# Patient Record
Sex: MALE | Race: WHITE | NOT HISPANIC OR LATINO | Employment: OTHER | URBAN - METROPOLITAN AREA
[De-identification: names, ages, dates, MRNs, and addresses within clinical notes are randomized per-mention and may not be internally consistent; named-entity substitution may affect disease eponyms.]

---

## 2021-04-06 ENCOUNTER — OFFICE VISIT (OUTPATIENT)
Dept: UROLOGY | Facility: CLINIC | Age: 81
End: 2021-04-06
Payer: MEDICARE

## 2021-04-06 ENCOUNTER — LAB VISIT (OUTPATIENT)
Dept: LAB | Facility: HOSPITAL | Age: 81
End: 2021-04-06
Attending: UROLOGY
Payer: MEDICARE

## 2021-04-06 VITALS — WEIGHT: 179.88 LBS

## 2021-04-06 DIAGNOSIS — C61 PROSTATE CANCER: Primary | ICD-10-CM

## 2021-04-06 DIAGNOSIS — K63.5 POLYP OF COLON, UNSPECIFIED PART OF COLON, UNSPECIFIED TYPE: ICD-10-CM

## 2021-04-06 DIAGNOSIS — C61 PROSTATE CANCER: ICD-10-CM

## 2021-04-06 DIAGNOSIS — R15.2 FECAL URGENCY: ICD-10-CM

## 2021-04-06 LAB
BILIRUB SERPL-MCNC: NORMAL MG/DL
BLOOD URINE, POC: NORMAL
CLARITY, POC UA: CLEAR
COLOR, POC UA: NORMAL
GLUCOSE UR QL STRIP: 250
KETONES UR QL STRIP: NORMAL
LEUKOCYTE ESTERASE URINE, POC: NORMAL
NITRITE, POC UA: NORMAL
PH, POC UA: 5
PROTEIN, POC: NORMAL
SPECIFIC GRAVITY, POC UA: 1.01
UROBILINOGEN, POC UA: NORMAL

## 2021-04-06 PROCEDURE — 81002 URINALYSIS NONAUTO W/O SCOPE: CPT | Mod: S$GLB,,, | Performed by: UROLOGY

## 2021-04-06 PROCEDURE — 1101F PR PT FALLS ASSESS DOC 0-1 FALLS W/OUT INJ PAST YR: ICD-10-PCS | Mod: CPTII,S$GLB,, | Performed by: UROLOGY

## 2021-04-06 PROCEDURE — 99999 PR PBB SHADOW E&M-NEW PATIENT-LVL III: ICD-10-PCS | Mod: PBBFAC,,, | Performed by: UROLOGY

## 2021-04-06 PROCEDURE — 84153 ASSAY OF PSA TOTAL: CPT | Performed by: UROLOGY

## 2021-04-06 PROCEDURE — 3288F FALL RISK ASSESSMENT DOCD: CPT | Mod: CPTII,S$GLB,, | Performed by: UROLOGY

## 2021-04-06 PROCEDURE — 1101F PT FALLS ASSESS-DOCD LE1/YR: CPT | Mod: CPTII,S$GLB,, | Performed by: UROLOGY

## 2021-04-06 PROCEDURE — 81002 POCT URINE DIPSTICK WITHOUT MICROSCOPE: ICD-10-PCS | Mod: S$GLB,,, | Performed by: UROLOGY

## 2021-04-06 PROCEDURE — 99213 OFFICE O/P EST LOW 20 MIN: CPT | Mod: 25,S$GLB,, | Performed by: UROLOGY

## 2021-04-06 PROCEDURE — 99213 PR OFFICE/OUTPT VISIT, EST, LEVL III, 20-29 MIN: ICD-10-PCS | Mod: 25,S$GLB,, | Performed by: UROLOGY

## 2021-04-06 PROCEDURE — 99999 PR PBB SHADOW E&M-NEW PATIENT-LVL III: CPT | Mod: PBBFAC,,, | Performed by: UROLOGY

## 2021-04-06 PROCEDURE — 1159F PR MEDICATION LIST DOCUMENTED IN MEDICAL RECORD: ICD-10-PCS | Mod: S$GLB,,, | Performed by: UROLOGY

## 2021-04-06 PROCEDURE — 3288F PR FALLS RISK ASSESSMENT DOCUMENTED: ICD-10-PCS | Mod: CPTII,S$GLB,, | Performed by: UROLOGY

## 2021-04-06 PROCEDURE — 36415 COLL VENOUS BLD VENIPUNCTURE: CPT | Mod: PO | Performed by: UROLOGY

## 2021-04-06 PROCEDURE — 1159F MED LIST DOCD IN RCRD: CPT | Mod: S$GLB,,, | Performed by: UROLOGY

## 2021-04-06 RX ORDER — SIMVASTATIN 40 MG/1
40 TABLET, FILM COATED ORAL DAILY
COMMUNITY
Start: 2021-03-15

## 2021-04-06 RX ORDER — METFORMIN HYDROCHLORIDE 1000 MG/1
1000 TABLET ORAL 2 TIMES DAILY
COMMUNITY

## 2021-04-06 RX ORDER — PANTOPRAZOLE SODIUM 40 MG/1
40 TABLET, DELAYED RELEASE ORAL DAILY
COMMUNITY
Start: 2021-03-15 | End: 2023-04-25

## 2021-04-06 RX ORDER — CETIRIZINE HYDROCHLORIDE 10 MG/1
10 TABLET ORAL DAILY
COMMUNITY
Start: 2021-02-13 | End: 2022-04-05

## 2021-04-06 RX ORDER — EMPAGLIFLOZIN 25 MG/1
25 TABLET, FILM COATED ORAL DAILY
COMMUNITY
Start: 2021-01-14

## 2021-04-06 RX ORDER — LOSARTAN POTASSIUM 100 MG/1
100 TABLET ORAL DAILY
COMMUNITY
Start: 2021-03-15

## 2021-04-06 RX ORDER — GLIMEPIRIDE 2 MG/1
2 TABLET ORAL DAILY
COMMUNITY
End: 2022-10-19

## 2021-04-06 RX ORDER — ERGOCALCIFEROL 1.25 MG/1
50000 CAPSULE ORAL
COMMUNITY
End: 2022-04-05

## 2021-04-07 LAB — COMPLEXED PSA SERPL-MCNC: 0.82 NG/ML (ref 0–4)

## 2021-07-01 ENCOUNTER — PATIENT MESSAGE (OUTPATIENT)
Dept: ADMINISTRATIVE | Facility: OTHER | Age: 81
End: 2021-07-01

## 2021-08-06 ENCOUNTER — OFFICE VISIT (OUTPATIENT)
Dept: UROLOGY | Facility: CLINIC | Age: 81
End: 2021-08-06
Payer: MEDICARE

## 2021-08-06 VITALS
SYSTOLIC BLOOD PRESSURE: 110 MMHG | DIASTOLIC BLOOD PRESSURE: 60 MMHG | WEIGHT: 172.81 LBS | HEIGHT: 69 IN | BODY MASS INDEX: 25.6 KG/M2

## 2021-08-06 DIAGNOSIS — N52.9 ERECTILE DYSFUNCTION, UNSPECIFIED ERECTILE DYSFUNCTION TYPE: ICD-10-CM

## 2021-08-06 DIAGNOSIS — N50.9 SCROTAL SKIN LESION: ICD-10-CM

## 2021-08-06 DIAGNOSIS — C61 PROSTATE CANCER: Primary | ICD-10-CM

## 2021-08-06 LAB
BILIRUB SERPL-MCNC: NORMAL MG/DL
BLOOD URINE, POC: NORMAL
CLARITY, POC UA: CLEAR
COLOR, POC UA: YELLOW
GLUCOSE UR QL STRIP: 1000
KETONES UR QL STRIP: NORMAL
LEUKOCYTE ESTERASE URINE, POC: NORMAL
NITRITE, POC UA: NORMAL
PH, POC UA: 5
PROTEIN, POC: NORMAL
SPECIFIC GRAVITY, POC UA: 1.01
UROBILINOGEN, POC UA: NORMAL

## 2021-08-06 PROCEDURE — 3078F DIAST BP <80 MM HG: CPT | Mod: CPTII,S$GLB,, | Performed by: UROLOGY

## 2021-08-06 PROCEDURE — 1160F RVW MEDS BY RX/DR IN RCRD: CPT | Mod: CPTII,S$GLB,, | Performed by: UROLOGY

## 2021-08-06 PROCEDURE — 3074F PR MOST RECENT SYSTOLIC BLOOD PRESSURE < 130 MM HG: ICD-10-PCS | Mod: CPTII,S$GLB,, | Performed by: UROLOGY

## 2021-08-06 PROCEDURE — 3288F FALL RISK ASSESSMENT DOCD: CPT | Mod: CPTII,S$GLB,, | Performed by: UROLOGY

## 2021-08-06 PROCEDURE — 99999 PR PBB SHADOW E&M-EST. PATIENT-LVL III: CPT | Mod: PBBFAC,,, | Performed by: UROLOGY

## 2021-08-06 PROCEDURE — 99999 PR PBB SHADOW E&M-EST. PATIENT-LVL III: ICD-10-PCS | Mod: PBBFAC,,, | Performed by: UROLOGY

## 2021-08-06 PROCEDURE — 1126F PR PAIN SEVERITY QUANTIFIED, NO PAIN PRESENT: ICD-10-PCS | Mod: CPTII,S$GLB,, | Performed by: UROLOGY

## 2021-08-06 PROCEDURE — 3074F SYST BP LT 130 MM HG: CPT | Mod: CPTII,S$GLB,, | Performed by: UROLOGY

## 2021-08-06 PROCEDURE — 1159F MED LIST DOCD IN RCRD: CPT | Mod: CPTII,S$GLB,, | Performed by: UROLOGY

## 2021-08-06 PROCEDURE — 1101F PT FALLS ASSESS-DOCD LE1/YR: CPT | Mod: CPTII,S$GLB,, | Performed by: UROLOGY

## 2021-08-06 PROCEDURE — 1101F PR PT FALLS ASSESS DOC 0-1 FALLS W/OUT INJ PAST YR: ICD-10-PCS | Mod: CPTII,S$GLB,, | Performed by: UROLOGY

## 2021-08-06 PROCEDURE — 99214 OFFICE O/P EST MOD 30 MIN: CPT | Mod: S$GLB,,, | Performed by: UROLOGY

## 2021-08-06 PROCEDURE — 1160F PR REVIEW ALL MEDS BY PRESCRIBER/CLIN PHARMACIST DOCUMENTED: ICD-10-PCS | Mod: CPTII,S$GLB,, | Performed by: UROLOGY

## 2021-08-06 PROCEDURE — 99214 PR OFFICE/OUTPT VISIT, EST, LEVL IV, 30-39 MIN: ICD-10-PCS | Mod: S$GLB,,, | Performed by: UROLOGY

## 2021-08-06 PROCEDURE — 1159F PR MEDICATION LIST DOCUMENTED IN MEDICAL RECORD: ICD-10-PCS | Mod: CPTII,S$GLB,, | Performed by: UROLOGY

## 2021-08-06 PROCEDURE — 1126F AMNT PAIN NOTED NONE PRSNT: CPT | Mod: CPTII,S$GLB,, | Performed by: UROLOGY

## 2021-08-06 PROCEDURE — 81002 URINALYSIS NONAUTO W/O SCOPE: CPT | Mod: S$GLB,,, | Performed by: UROLOGY

## 2021-08-06 PROCEDURE — 3078F PR MOST RECENT DIASTOLIC BLOOD PRESSURE < 80 MM HG: ICD-10-PCS | Mod: CPTII,S$GLB,, | Performed by: UROLOGY

## 2021-08-06 PROCEDURE — 3288F PR FALLS RISK ASSESSMENT DOCUMENTED: ICD-10-PCS | Mod: CPTII,S$GLB,, | Performed by: UROLOGY

## 2021-08-06 PROCEDURE — 81002 POCT URINE DIPSTICK WITHOUT MICROSCOPE: ICD-10-PCS | Mod: S$GLB,,, | Performed by: UROLOGY

## 2021-08-06 RX ORDER — TAMSULOSIN HYDROCHLORIDE 0.4 MG/1
CAPSULE ORAL
COMMUNITY
End: 2021-08-06 | Stop reason: SDUPTHER

## 2021-08-06 RX ORDER — TAMSULOSIN HYDROCHLORIDE 0.4 MG/1
CAPSULE ORAL
Qty: 30 CAPSULE | Refills: 11 | Status: SHIPPED | OUTPATIENT
Start: 2021-08-06 | End: 2022-04-05

## 2021-08-09 ENCOUNTER — PATIENT MESSAGE (OUTPATIENT)
Dept: UROLOGY | Facility: CLINIC | Age: 81
End: 2021-08-09

## 2021-08-09 RX ORDER — SILDENAFIL 100 MG/1
100 TABLET, FILM COATED ORAL DAILY PRN
Qty: 10 TABLET | Refills: 11 | Status: SHIPPED | OUTPATIENT
Start: 2021-08-09 | End: 2022-04-05

## 2021-09-27 ENCOUNTER — LAB VISIT (OUTPATIENT)
Dept: LAB | Facility: HOSPITAL | Age: 81
End: 2021-09-27
Attending: UROLOGY
Payer: MEDICARE

## 2021-09-27 ENCOUNTER — TELEPHONE (OUTPATIENT)
Dept: UROLOGY | Facility: CLINIC | Age: 81
End: 2021-09-27

## 2021-09-27 DIAGNOSIS — C61 PROSTATE CANCER: ICD-10-CM

## 2021-09-27 LAB — COMPLEXED PSA SERPL-MCNC: 0.29 NG/ML (ref 0–4)

## 2021-09-27 PROCEDURE — 36415 COLL VENOUS BLD VENIPUNCTURE: CPT | Mod: PO | Performed by: UROLOGY

## 2021-09-27 PROCEDURE — 84153 ASSAY OF PSA TOTAL: CPT | Performed by: UROLOGY

## 2021-10-05 ENCOUNTER — OFFICE VISIT (OUTPATIENT)
Dept: UROLOGY | Facility: CLINIC | Age: 81
End: 2021-10-05
Payer: MEDICARE

## 2021-10-05 VITALS
WEIGHT: 175.25 LBS | DIASTOLIC BLOOD PRESSURE: 70 MMHG | BODY MASS INDEX: 26.26 KG/M2 | SYSTOLIC BLOOD PRESSURE: 110 MMHG

## 2021-10-05 DIAGNOSIS — C61 PROSTATE CANCER: ICD-10-CM

## 2021-10-05 DIAGNOSIS — R30.0 DYSURIA: Primary | ICD-10-CM

## 2021-10-05 LAB
BILIRUB SERPL-MCNC: NORMAL MG/DL
BLOOD URINE, POC: NORMAL
CLARITY, POC UA: CLEAR
COLOR, POC UA: YELLOW
GLUCOSE UR QL STRIP: 250
KETONES UR QL STRIP: NORMAL
LEUKOCYTE ESTERASE URINE, POC: NORMAL
NITRITE, POC UA: NORMAL
PH, POC UA: 5
PROTEIN, POC: NORMAL
SPECIFIC GRAVITY, POC UA: 1.01
UROBILINOGEN, POC UA: NORMAL

## 2021-10-05 PROCEDURE — 1126F PR PAIN SEVERITY QUANTIFIED, NO PAIN PRESENT: ICD-10-PCS | Mod: CPTII,S$GLB,, | Performed by: UROLOGY

## 2021-10-05 PROCEDURE — 87086 URINE CULTURE/COLONY COUNT: CPT | Performed by: UROLOGY

## 2021-10-05 PROCEDURE — 3288F FALL RISK ASSESSMENT DOCD: CPT | Mod: CPTII,S$GLB,, | Performed by: UROLOGY

## 2021-10-05 PROCEDURE — 81002 URINALYSIS NONAUTO W/O SCOPE: CPT | Mod: S$GLB,,, | Performed by: UROLOGY

## 2021-10-05 PROCEDURE — 1159F MED LIST DOCD IN RCRD: CPT | Mod: CPTII,S$GLB,, | Performed by: UROLOGY

## 2021-10-05 PROCEDURE — 99213 PR OFFICE/OUTPT VISIT, EST, LEVL III, 20-29 MIN: ICD-10-PCS | Mod: S$GLB,,, | Performed by: UROLOGY

## 2021-10-05 PROCEDURE — 81002 POCT URINE DIPSTICK WITHOUT MICROSCOPE: ICD-10-PCS | Mod: S$GLB,,, | Performed by: UROLOGY

## 2021-10-05 PROCEDURE — 1159F PR MEDICATION LIST DOCUMENTED IN MEDICAL RECORD: ICD-10-PCS | Mod: CPTII,S$GLB,, | Performed by: UROLOGY

## 2021-10-05 PROCEDURE — 99999 PR PBB SHADOW E&M-EST. PATIENT-LVL III: ICD-10-PCS | Mod: PBBFAC,,, | Performed by: UROLOGY

## 2021-10-05 PROCEDURE — 3078F DIAST BP <80 MM HG: CPT | Mod: CPTII,S$GLB,, | Performed by: UROLOGY

## 2021-10-05 PROCEDURE — 1126F AMNT PAIN NOTED NONE PRSNT: CPT | Mod: CPTII,S$GLB,, | Performed by: UROLOGY

## 2021-10-05 PROCEDURE — 99999 PR PBB SHADOW E&M-EST. PATIENT-LVL III: CPT | Mod: PBBFAC,,, | Performed by: UROLOGY

## 2021-10-05 PROCEDURE — 3074F SYST BP LT 130 MM HG: CPT | Mod: CPTII,S$GLB,, | Performed by: UROLOGY

## 2021-10-05 PROCEDURE — 3078F PR MOST RECENT DIASTOLIC BLOOD PRESSURE < 80 MM HG: ICD-10-PCS | Mod: CPTII,S$GLB,, | Performed by: UROLOGY

## 2021-10-05 PROCEDURE — 3288F PR FALLS RISK ASSESSMENT DOCUMENTED: ICD-10-PCS | Mod: CPTII,S$GLB,, | Performed by: UROLOGY

## 2021-10-05 PROCEDURE — 99213 OFFICE O/P EST LOW 20 MIN: CPT | Mod: S$GLB,,, | Performed by: UROLOGY

## 2021-10-05 PROCEDURE — 1101F PR PT FALLS ASSESS DOC 0-1 FALLS W/OUT INJ PAST YR: ICD-10-PCS | Mod: CPTII,S$GLB,, | Performed by: UROLOGY

## 2021-10-05 PROCEDURE — 1101F PT FALLS ASSESS-DOCD LE1/YR: CPT | Mod: CPTII,S$GLB,, | Performed by: UROLOGY

## 2021-10-05 PROCEDURE — 3074F PR MOST RECENT SYSTOLIC BLOOD PRESSURE < 130 MM HG: ICD-10-PCS | Mod: CPTII,S$GLB,, | Performed by: UROLOGY

## 2021-10-06 LAB
BACTERIA UR CULT: NORMAL
BACTERIA UR CULT: NORMAL

## 2022-04-01 ENCOUNTER — LAB VISIT (OUTPATIENT)
Dept: LAB | Facility: HOSPITAL | Age: 82
End: 2022-04-01
Attending: UROLOGY
Payer: MEDICARE

## 2022-04-01 DIAGNOSIS — C61 PROSTATE CANCER: ICD-10-CM

## 2022-04-01 PROCEDURE — 84153 ASSAY OF PSA TOTAL: CPT | Performed by: UROLOGY

## 2022-04-01 PROCEDURE — 36415 COLL VENOUS BLD VENIPUNCTURE: CPT | Mod: PO | Performed by: UROLOGY

## 2022-04-02 LAB — COMPLEXED PSA SERPL-MCNC: 0.17 NG/ML (ref 0–4)

## 2022-04-05 ENCOUNTER — OFFICE VISIT (OUTPATIENT)
Dept: UROLOGY | Facility: CLINIC | Age: 82
End: 2022-04-05
Payer: MEDICARE

## 2022-04-05 VITALS
WEIGHT: 175.69 LBS | HEIGHT: 69 IN | SYSTOLIC BLOOD PRESSURE: 116 MMHG | BODY MASS INDEX: 26.02 KG/M2 | DIASTOLIC BLOOD PRESSURE: 66 MMHG

## 2022-04-05 DIAGNOSIS — E11.9 TYPE 2 DIABETES MELLITUS WITHOUT COMPLICATION, WITHOUT LONG-TERM CURRENT USE OF INSULIN: ICD-10-CM

## 2022-04-05 DIAGNOSIS — C61 PROSTATE CANCER: Primary | ICD-10-CM

## 2022-04-05 DIAGNOSIS — N52.9 ERECTILE DYSFUNCTION, UNSPECIFIED ERECTILE DYSFUNCTION TYPE: ICD-10-CM

## 2022-04-05 PROCEDURE — 99214 PR OFFICE/OUTPT VISIT, EST, LEVL IV, 30-39 MIN: ICD-10-PCS | Mod: S$GLB,,, | Performed by: UROLOGY

## 2022-04-05 PROCEDURE — 3288F FALL RISK ASSESSMENT DOCD: CPT | Mod: CPTII,S$GLB,, | Performed by: UROLOGY

## 2022-04-05 PROCEDURE — 3078F PR MOST RECENT DIASTOLIC BLOOD PRESSURE < 80 MM HG: ICD-10-PCS | Mod: CPTII,S$GLB,, | Performed by: UROLOGY

## 2022-04-05 PROCEDURE — 1101F PR PT FALLS ASSESS DOC 0-1 FALLS W/OUT INJ PAST YR: ICD-10-PCS | Mod: CPTII,S$GLB,, | Performed by: UROLOGY

## 2022-04-05 PROCEDURE — 3074F PR MOST RECENT SYSTOLIC BLOOD PRESSURE < 130 MM HG: ICD-10-PCS | Mod: CPTII,S$GLB,, | Performed by: UROLOGY

## 2022-04-05 PROCEDURE — 1101F PT FALLS ASSESS-DOCD LE1/YR: CPT | Mod: CPTII,S$GLB,, | Performed by: UROLOGY

## 2022-04-05 PROCEDURE — 99999 PR PBB SHADOW E&M-EST. PATIENT-LVL III: ICD-10-PCS | Mod: PBBFAC,,, | Performed by: UROLOGY

## 2022-04-05 PROCEDURE — 1126F AMNT PAIN NOTED NONE PRSNT: CPT | Mod: CPTII,S$GLB,, | Performed by: UROLOGY

## 2022-04-05 PROCEDURE — 1159F MED LIST DOCD IN RCRD: CPT | Mod: CPTII,S$GLB,, | Performed by: UROLOGY

## 2022-04-05 PROCEDURE — 3074F SYST BP LT 130 MM HG: CPT | Mod: CPTII,S$GLB,, | Performed by: UROLOGY

## 2022-04-05 PROCEDURE — 99214 OFFICE O/P EST MOD 30 MIN: CPT | Mod: S$GLB,,, | Performed by: UROLOGY

## 2022-04-05 PROCEDURE — 3288F PR FALLS RISK ASSESSMENT DOCUMENTED: ICD-10-PCS | Mod: CPTII,S$GLB,, | Performed by: UROLOGY

## 2022-04-05 PROCEDURE — 3078F DIAST BP <80 MM HG: CPT | Mod: CPTII,S$GLB,, | Performed by: UROLOGY

## 2022-04-05 PROCEDURE — 1159F PR MEDICATION LIST DOCUMENTED IN MEDICAL RECORD: ICD-10-PCS | Mod: CPTII,S$GLB,, | Performed by: UROLOGY

## 2022-04-05 PROCEDURE — 1126F PR PAIN SEVERITY QUANTIFIED, NO PAIN PRESENT: ICD-10-PCS | Mod: CPTII,S$GLB,, | Performed by: UROLOGY

## 2022-04-05 PROCEDURE — 99999 PR PBB SHADOW E&M-EST. PATIENT-LVL III: CPT | Mod: PBBFAC,,, | Performed by: UROLOGY

## 2022-04-05 RX ORDER — TADALAFIL 20 MG/1
20 TABLET ORAL DAILY PRN
Qty: 30 TABLET | Refills: 11 | Status: SHIPPED | OUTPATIENT
Start: 2022-04-05 | End: 2023-04-25 | Stop reason: SDUPTHER

## 2022-04-05 NOTE — PROGRESS NOTES
Chief Complaint   Patient presents with    Other     6 month w psa         History of Present Illness:   Brock Camara is a 82 y.o. male here for follow up.     4/5/22-Good stream. No gross hematuria. Still has diarrhea. No urinary urgency. No longer on flomax. Still with ED. viagra didn't work well. Reports diabetic control is fair.   10/5/21-On flomax. Reports dysuria every time he urinates. No gross hematuria. Drinking cranberry juice, which helps. Stream is weak.   8/6/21: Pt reports retrograde ejaculation, ED and a growth on the side of his scrotum. He states that he ejaculated in June and had a severe pain in his Left groin. Restarted flomax, but wasn't able to ejaculate even before the flomax. After restarting the flomax, ejaculated, but no pain, not much semen comes out. Hasn't really ejaculated much since radiation. Denies stranguria. Slight dysuria, but not much. Has some initial dribbling of his stream, but then it strengthens. Had a colonoscopy and EUS. Had 5 polyps. Still has Fecal Urgency.   4/6/21: Brock Camara is a 81 y.o. male here for follow up. He has a h/o [place] 4+3 prostate adenocarcinoma, diagnosed 5/2020 with a PSA of 7.6. Completed XRT 8/2020 with Dr. Carney. No ADT. Urinating okay. Slight dysuria. No gross hematuria. Stopped taking flomax around September because he wasn't having any pain. Also has fecal urgency and FI. States that he hasn't been back to see his Gastroenterologist, but needs to, due to h/o 16 polyps on last colonoscopy.     Review of Systems   Constitutional: Negative for chills and fever.   Respiratory: Negative for shortness of breath.    Cardiovascular: Negative for chest pain.   Gastrointestinal: Positive for diarrhea. Negative for abdominal pain.   Genitourinary: Negative for difficulty urinating.   All other systems reviewed and are negative.      Current Outpatient Medications   Medication Sig Dispense Refill    glimepiride (AMARYL) 2 MG tablet 2 mg once  "daily.      JARDIANCE 25 mg tablet Take 25 mg by mouth once daily.      losartan (COZAAR) 100 MG tablet Take 100 mg by mouth once daily.      metFORMIN (GLUCOPHAGE) 1000 MG tablet 1,000 mg 2 (two) times a day.      pantoprazole (PROTONIX) 40 MG tablet Take 40 mg by mouth once daily.      simvastatin (ZOCOR) 40 MG tablet Take 40 mg by mouth once daily.      cetirizine (ZYRTEC) 10 MG tablet Take 10 mg by mouth once daily.      ergocalciferol (VITAMIN D2) 50,000 unit Cap Take 50,000 Units by mouth every 7 days.      tadalafiL (CIALIS) 20 MG Tab Take 1 tablet (20 mg total) by mouth daily as needed (intercourse). 30 tablet 11     No current facility-administered medications for this visit.       Review of patient's allergies indicates:   Allergen Reactions    Adhesive      Pt states allergic to "Paper Tape" = rash       Past medical, family, and social history reviewed as documented in chart with pertinent positive medical, family, and social history detailed in HPI.    Physical Exam  Vitals:    04/05/22 1416   BP: 116/66       General: Well-developed, well-nourished, in no acute distress  HEENT: Normocephalic, atraumatic, extraocular eye movements in tact  Neck: supple, trachea midline, no cervical or supraclavicular adenopathy  Respirations: Even and unlabored  Abdomen: soft, Non-tender, Non-distended, no palpable masses, no rebound or guarding  Extremities: Moves all extremities equally, atraumatic, no clubbing, cyanosis, trace edema  Skin: warm and dry, no lesions  Psych: normal affect  Neuro: Alert and oriented x 3. Cranial nerves II-XII grossly intact    Urinalysis  Negative for blood, LE, nitrite    PSA  4/6/21-0.82  9/27/21-0.29  4/1/22: 0.17    Assessment:   1. Prostate cancer  Prostate Specific Antigen, Diagnostic   2. Erectile dysfunction, unspecified erectile dysfunction type  POCT URINE DIPSTICK WITHOUT MICROSCOPE   3. Type 2 diabetes mellitus without complication, without long-term current use of " insulin           Plan:  Prostate cancer  -     Prostate Specific Antigen, Diagnostic; Future; Expected date: 10/05/2022    Erectile dysfunction, unspecified erectile dysfunction type  -     POCT URINE DIPSTICK WITHOUT MICROSCOPE    Type 2 diabetes mellitus without complication, without long-term current use of insulin    Other orders  -     tadalafiL (CIALIS) 20 MG Tab; Take 1 tablet (20 mg total) by mouth daily as needed (intercourse).  Dispense: 30 tablet; Refill: 11    continue current diabetic management  Discussed [facility]. Pt not interested, but would like to try cialis.   Follow up in about 6 months (around 10/5/2022) for labs before visit.

## 2022-10-05 ENCOUNTER — PATIENT MESSAGE (OUTPATIENT)
Dept: UROLOGY | Facility: CLINIC | Age: 82
End: 2022-10-05
Payer: MEDICARE

## 2022-10-05 ENCOUNTER — TELEPHONE (OUTPATIENT)
Dept: UROLOGY | Facility: CLINIC | Age: 82
End: 2022-10-05
Payer: MEDICARE

## 2022-10-05 ENCOUNTER — LAB VISIT (OUTPATIENT)
Dept: LAB | Facility: HOSPITAL | Age: 82
End: 2022-10-05
Attending: UROLOGY
Payer: MEDICARE

## 2022-10-05 DIAGNOSIS — C61 PROSTATE CANCER: ICD-10-CM

## 2022-10-05 LAB — COMPLEXED PSA SERPL-MCNC: 0.1 NG/ML (ref 0–4)

## 2022-10-05 PROCEDURE — 84153 ASSAY OF PSA TOTAL: CPT | Performed by: UROLOGY

## 2022-10-05 PROCEDURE — 36415 COLL VENOUS BLD VENIPUNCTURE: CPT | Mod: PO | Performed by: UROLOGY

## 2022-10-05 NOTE — TELEPHONE ENCOUNTER
Called pt, no answer, left message on voicemail informing pt that Dr Masters had to leave early due to being exposed to covid and we need to get him rescheduled.

## 2022-10-18 ENCOUNTER — OFFICE VISIT (OUTPATIENT)
Dept: UROLOGY | Facility: CLINIC | Age: 82
End: 2022-10-18
Payer: MEDICARE

## 2022-10-18 VITALS
DIASTOLIC BLOOD PRESSURE: 70 MMHG | WEIGHT: 165.38 LBS | BODY MASS INDEX: 24.77 KG/M2 | SYSTOLIC BLOOD PRESSURE: 110 MMHG

## 2022-10-18 DIAGNOSIS — C61 PROSTATE CANCER: Primary | ICD-10-CM

## 2022-10-18 DIAGNOSIS — R19.7 DIARRHEA, UNSPECIFIED TYPE: ICD-10-CM

## 2022-10-18 DIAGNOSIS — N52.35 ERECTILE DYSFUNCTION FOLLOWING RADIATION THERAPY: ICD-10-CM

## 2022-10-18 DIAGNOSIS — E11.9 TYPE 2 DIABETES MELLITUS WITHOUT COMPLICATION, WITHOUT LONG-TERM CURRENT USE OF INSULIN: ICD-10-CM

## 2022-10-18 PROCEDURE — 1160F RVW MEDS BY RX/DR IN RCRD: CPT | Mod: CPTII,S$GLB,, | Performed by: UROLOGY

## 2022-10-18 PROCEDURE — 1126F AMNT PAIN NOTED NONE PRSNT: CPT | Mod: CPTII,S$GLB,, | Performed by: UROLOGY

## 2022-10-18 PROCEDURE — 1159F MED LIST DOCD IN RCRD: CPT | Mod: CPTII,S$GLB,, | Performed by: UROLOGY

## 2022-10-18 PROCEDURE — 1159F PR MEDICATION LIST DOCUMENTED IN MEDICAL RECORD: ICD-10-PCS | Mod: CPTII,S$GLB,, | Performed by: UROLOGY

## 2022-10-18 PROCEDURE — 1126F PR PAIN SEVERITY QUANTIFIED, NO PAIN PRESENT: ICD-10-PCS | Mod: CPTII,S$GLB,, | Performed by: UROLOGY

## 2022-10-18 PROCEDURE — 99214 OFFICE O/P EST MOD 30 MIN: CPT | Mod: S$GLB,,, | Performed by: UROLOGY

## 2022-10-18 PROCEDURE — 3074F SYST BP LT 130 MM HG: CPT | Mod: CPTII,S$GLB,, | Performed by: UROLOGY

## 2022-10-18 PROCEDURE — 3074F PR MOST RECENT SYSTOLIC BLOOD PRESSURE < 130 MM HG: ICD-10-PCS | Mod: CPTII,S$GLB,, | Performed by: UROLOGY

## 2022-10-18 PROCEDURE — 3078F PR MOST RECENT DIASTOLIC BLOOD PRESSURE < 80 MM HG: ICD-10-PCS | Mod: CPTII,S$GLB,, | Performed by: UROLOGY

## 2022-10-18 PROCEDURE — 99999 PR PBB SHADOW E&M-EST. PATIENT-LVL II: ICD-10-PCS | Mod: PBBFAC,,, | Performed by: UROLOGY

## 2022-10-18 PROCEDURE — 99999 PR PBB SHADOW E&M-EST. PATIENT-LVL II: CPT | Mod: PBBFAC,,, | Performed by: UROLOGY

## 2022-10-18 PROCEDURE — 1160F PR REVIEW ALL MEDS BY PRESCRIBER/CLIN PHARMACIST DOCUMENTED: ICD-10-PCS | Mod: CPTII,S$GLB,, | Performed by: UROLOGY

## 2022-10-18 PROCEDURE — 3078F DIAST BP <80 MM HG: CPT | Mod: CPTII,S$GLB,, | Performed by: UROLOGY

## 2022-10-18 PROCEDURE — 99214 PR OFFICE/OUTPT VISIT, EST, LEVL IV, 30-39 MIN: ICD-10-PCS | Mod: S$GLB,,, | Performed by: UROLOGY

## 2022-10-18 NOTE — PROGRESS NOTES
CC: follow up prostate cancer    History of Present Illness:   Brock Camara is a 82 y.o. male here for follow up.   10/18/22- No urgency or gross hematuria. Occasional dysuria, but not bad. Cialis working well. Energy varies. No bone pain. Still with some diarrhea post-XRT.   4/5/22-Good stream. No gross hematuria. Still has diarrhea. No urinary urgency. No longer on flomax. Still with ED. viagra didn't work well. Reports diabetic control is fair.   10/5/21-On flomax. Reports dysuria every time he urinates. No gross hematuria. Drinking cranberry juice, which helps. Stream is weak.   8/6/21: Pt reports retrograde ejaculation, ED and a growth on the side of his scrotum. He states that he ejaculated in June and had a severe pain in his Left groin. Restarted flomax, but wasn't able to ejaculate even before the flomax. After restarting the flomax, ejaculated, but no pain, not much semen comes out. Hasn't really ejaculated much since radiation. Denies stranguria. Slight dysuria, but not much. Has some initial dribbling of his stream, but then it strengthens. Had a colonoscopy and EUS. Had 5 polyps. Still has Fecal Urgency.   4/6/21: Brock Camara is a 81 y.o. male here for follow up. He has a h/o [place] 4+3 prostate adenocarcinoma, diagnosed 5/2020 with a PSA of 7.6. Completed XRT 8/2020 with Dr. Carney. No ADT. Urinating okay. Slight dysuria. No gross hematuria. Stopped taking flomax around September because he wasn't having any pain. Also has fecal urgency and FI. States that he hasn't been back to see his Gastroenterologist, but needs to, due to h/o 16 polyps on last colonoscopy.     Review of Systems   Constitutional:  Negative for chills and fever.   Respiratory:  Negative for shortness of breath.    Cardiovascular:  Negative for chest pain.   Gastrointestinal:  Positive for diarrhea. Negative for abdominal pain.   Genitourinary:  Negative for difficulty urinating and hematuria.   All other systems reviewed  "and are negative.    Current Outpatient Medications   Medication Sig Dispense Refill    glimepiride (AMARYL) 2 MG tablet 2 mg once daily.      JARDIANCE 25 mg tablet Take 25 mg by mouth once daily.      losartan (COZAAR) 100 MG tablet Take 100 mg by mouth once daily.      metFORMIN (GLUCOPHAGE) 1000 MG tablet 1,000 mg 2 (two) times a day.      pantoprazole (PROTONIX) 40 MG tablet Take 40 mg by mouth once daily.      simvastatin (ZOCOR) 40 MG tablet Take 40 mg by mouth once daily.      tadalafiL (CIALIS) 20 MG Tab Take 1 tablet (20 mg total) by mouth daily as needed (intercourse). 30 tablet 11     No current facility-administered medications for this visit.       Review of patient's allergies indicates:   Allergen Reactions    Adhesive      Pt states allergic to "Paper Tape" = rash       Past medical, family, and social history reviewed as documented in chart with pertinent positive medical, family, and social history detailed in HPI.    Physical Exam  Vitals:    10/18/22 1606   BP: 110/70       General: Well-developed, well-nourished, in no acute distress  HEENT: Normocephalic, atraumatic, extraocular eye movements in tact  Neck: supple, trachea midline, no cervical or supraclavicular adenopathy  Respirations: Even and unlabored  Abdomen: soft, Non-tender, Non-distended, no palpable masses, no rebound or guarding  Extremities: Moves all extremities equally, atraumatic, no clubbing, cyanosis, trace edema  Skin: warm and dry, no lesions  Psych: normal affect  Neuro: Alert and oriented x 3. Cranial nerves II-XII grossly intact    Urinalysis  Negative for blood, LE, nitrite, 250 glucose    PSA  4/6/21-0.82  9/27/21-0.29  4/1/22: 0.17  10/5/22: 0.10    Assessment:   1. Prostate cancer  Prostate Specific Antigen, Diagnostic      2. Erectile dysfunction following radiation therapy        3. Diarrhea, unspecified type        4. Type 2 diabetes mellitus without complication, without long-term current use of insulin      "           Plan:  Prostate cancer  -     Prostate Specific Antigen, Diagnostic; Future; Expected date: 04/18/2023    Erectile dysfunction following radiation therapy    Diarrhea, unspecified type    Type 2 diabetes mellitus without complication, without long-term current use of insulin  PSA decreasing appropriately  continue current diabetic management  Continue cialis PRN.   Follow up in about 6 months (around 4/18/2023) for labs before visit.

## 2022-10-19 PROBLEM — E11.9 TYPE 2 DIABETES MELLITUS WITHOUT COMPLICATION, WITHOUT LONG-TERM CURRENT USE OF INSULIN: Status: ACTIVE | Noted: 2022-10-19

## 2023-04-19 ENCOUNTER — LAB VISIT (OUTPATIENT)
Dept: LAB | Facility: HOSPITAL | Age: 83
End: 2023-04-19
Attending: UROLOGY
Payer: MEDICARE

## 2023-04-19 DIAGNOSIS — C61 PROSTATE CANCER: ICD-10-CM

## 2023-04-19 LAB — COMPLEXED PSA SERPL-MCNC: <0.01 NG/ML (ref 0–4)

## 2023-04-19 PROCEDURE — 84153 ASSAY OF PSA TOTAL: CPT | Performed by: UROLOGY

## 2023-04-19 PROCEDURE — 36415 COLL VENOUS BLD VENIPUNCTURE: CPT | Mod: PO | Performed by: UROLOGY

## 2023-04-25 ENCOUNTER — OFFICE VISIT (OUTPATIENT)
Dept: UROLOGY | Facility: CLINIC | Age: 83
End: 2023-04-25
Payer: MEDICARE

## 2023-04-25 VITALS
WEIGHT: 174.63 LBS | HEIGHT: 68 IN | HEART RATE: 76 BPM | DIASTOLIC BLOOD PRESSURE: 66 MMHG | RESPIRATION RATE: 18 BRPM | BODY MASS INDEX: 26.47 KG/M2 | SYSTOLIC BLOOD PRESSURE: 126 MMHG

## 2023-04-25 DIAGNOSIS — C61 PROSTATE CANCER: Primary | ICD-10-CM

## 2023-04-25 DIAGNOSIS — E11.9 TYPE 2 DIABETES MELLITUS WITHOUT COMPLICATION, WITHOUT LONG-TERM CURRENT USE OF INSULIN: ICD-10-CM

## 2023-04-25 DIAGNOSIS — N52.35 ERECTILE DYSFUNCTION FOLLOWING RADIATION THERAPY: ICD-10-CM

## 2023-04-25 PROCEDURE — 1101F PT FALLS ASSESS-DOCD LE1/YR: CPT | Mod: CPTII,S$GLB,, | Performed by: UROLOGY

## 2023-04-25 PROCEDURE — 1160F RVW MEDS BY RX/DR IN RCRD: CPT | Mod: CPTII,S$GLB,, | Performed by: UROLOGY

## 2023-04-25 PROCEDURE — 1101F PR PT FALLS ASSESS DOC 0-1 FALLS W/OUT INJ PAST YR: ICD-10-PCS | Mod: CPTII,S$GLB,, | Performed by: UROLOGY

## 2023-04-25 PROCEDURE — 1159F PR MEDICATION LIST DOCUMENTED IN MEDICAL RECORD: ICD-10-PCS | Mod: CPTII,S$GLB,, | Performed by: UROLOGY

## 2023-04-25 PROCEDURE — 99999 PR PBB SHADOW E&M-EST. PATIENT-LVL III: CPT | Mod: PBBFAC,,, | Performed by: UROLOGY

## 2023-04-25 PROCEDURE — 3078F DIAST BP <80 MM HG: CPT | Mod: CPTII,S$GLB,, | Performed by: UROLOGY

## 2023-04-25 PROCEDURE — 3288F PR FALLS RISK ASSESSMENT DOCUMENTED: ICD-10-PCS | Mod: CPTII,S$GLB,, | Performed by: UROLOGY

## 2023-04-25 PROCEDURE — 99999 PR PBB SHADOW E&M-EST. PATIENT-LVL III: ICD-10-PCS | Mod: PBBFAC,,, | Performed by: UROLOGY

## 2023-04-25 PROCEDURE — 3074F SYST BP LT 130 MM HG: CPT | Mod: CPTII,S$GLB,, | Performed by: UROLOGY

## 2023-04-25 PROCEDURE — 1159F MED LIST DOCD IN RCRD: CPT | Mod: CPTII,S$GLB,, | Performed by: UROLOGY

## 2023-04-25 PROCEDURE — 3078F PR MOST RECENT DIASTOLIC BLOOD PRESSURE < 80 MM HG: ICD-10-PCS | Mod: CPTII,S$GLB,, | Performed by: UROLOGY

## 2023-04-25 PROCEDURE — 3288F FALL RISK ASSESSMENT DOCD: CPT | Mod: CPTII,S$GLB,, | Performed by: UROLOGY

## 2023-04-25 PROCEDURE — 1126F PR PAIN SEVERITY QUANTIFIED, NO PAIN PRESENT: ICD-10-PCS | Mod: CPTII,S$GLB,, | Performed by: UROLOGY

## 2023-04-25 PROCEDURE — 1126F AMNT PAIN NOTED NONE PRSNT: CPT | Mod: CPTII,S$GLB,, | Performed by: UROLOGY

## 2023-04-25 PROCEDURE — 1160F PR REVIEW ALL MEDS BY PRESCRIBER/CLIN PHARMACIST DOCUMENTED: ICD-10-PCS | Mod: CPTII,S$GLB,, | Performed by: UROLOGY

## 2023-04-25 PROCEDURE — 99214 PR OFFICE/OUTPT VISIT, EST, LEVL IV, 30-39 MIN: ICD-10-PCS | Mod: S$GLB,,, | Performed by: UROLOGY

## 2023-04-25 PROCEDURE — 99214 OFFICE O/P EST MOD 30 MIN: CPT | Mod: S$GLB,,, | Performed by: UROLOGY

## 2023-04-25 PROCEDURE — 3074F PR MOST RECENT SYSTOLIC BLOOD PRESSURE < 130 MM HG: ICD-10-PCS | Mod: CPTII,S$GLB,, | Performed by: UROLOGY

## 2023-04-25 RX ORDER — GLIMEPIRIDE 2 MG/1
2 TABLET ORAL
COMMUNITY

## 2023-04-25 RX ORDER — TADALAFIL 20 MG/1
20 TABLET ORAL DAILY PRN
Qty: 30 TABLET | Refills: 11 | Status: SHIPPED | OUTPATIENT
Start: 2023-04-25 | End: 2024-04-24

## 2023-04-25 RX ORDER — OMEPRAZOLE 40 MG/1
40 CAPSULE, DELAYED RELEASE ORAL DAILY
COMMUNITY

## 2023-04-25 NOTE — PROGRESS NOTES
CC: follow up prostate cancer    History of Present Illness:   Brock Camara is a 83 y.o. male here for follow up.       4/25/23-no urgency or gross hematuria. No dysuria. Cialis works well. Stream is fair. No stranguria. Diarrhea cleared up-- was related to protonix.   10/18/22- No urgency or gross hematuria. Occasional dysuria, but not bad. Cialis working well. Energy varies. No bone pain. Still with some diarrhea post-XRT.   4/5/22-Good stream. No gross hematuria. Still has diarrhea. No urinary urgency. No longer on flomax. Still with ED. viagra didn't work well. Reports diabetic control is fair.   10/5/21-On flomax. Reports dysuria every time he urinates. No gross hematuria. Drinking cranberry juice, which helps. Stream is weak.   8/6/21: Pt reports retrograde ejaculation, ED and a growth on the side of his scrotum. He states that he ejaculated in June and had a severe pain in his Left groin. Restarted flomax, but wasn't able to ejaculate even before the flomax. After restarting the flomax, ejaculated, but no pain, not much semen comes out. Hasn't really ejaculated much since radiation. Denies stranguria. Slight dysuria, but not much. Has some initial dribbling of his stream, but then it strengthens. Had a colonoscopy and EUS. Had 5 polyps. Still has Fecal Urgency.   4/6/21: Brock Camara is a 81 y.o. male here for follow up. He has a h/o [place] 4+3 prostate adenocarcinoma, diagnosed 5/2020 with a PSA of 7.6. Completed XRT 8/2020 with Dr. Carney. No ADT. Urinating okay. Slight dysuria. No gross hematuria. Stopped taking flomax around September because he wasn't having any pain. Also has fecal urgency and FI. States that he hasn't been back to see his Gastroenterologist, but needs to, due to h/o 16 polyps on last colonoscopy.     Review of Systems   Constitutional:  Negative for chills and fever.   Respiratory:  Negative for shortness of breath.    Cardiovascular:  Negative for chest pain.  "  Gastrointestinal:  Negative for abdominal pain and diarrhea.   Genitourinary:  Negative for difficulty urinating and hematuria.   All other systems reviewed and are negative.    Current Outpatient Medications   Medication Sig Dispense Refill    glimepiride (AMARYL) 2 MG tablet Take 2 mg by mouth before breakfast.      JARDIANCE 25 mg tablet Take 25 mg by mouth once daily.      losartan (COZAAR) 100 MG tablet Take 100 mg by mouth once daily.      metFORMIN (GLUCOPHAGE) 1000 MG tablet 1,000 mg 2 (two) times a day.      omeprazole (PRILOSEC) 40 MG capsule Take 40 mg by mouth once daily.      simvastatin (ZOCOR) 40 MG tablet Take 40 mg by mouth once daily.      tadalafiL (CIALIS) 20 MG Tab Take 1 tablet (20 mg total) by mouth daily as needed (intercourse). 30 tablet 11     No current facility-administered medications for this visit.       Review of patient's allergies indicates:   Allergen Reactions    Adhesive      Pt states allergic to "Paper Tape" = rash       Past medical, family, and social history reviewed as documented in chart with pertinent positive medical, family, and social history detailed in HPI.    Physical Exam  Vitals:    04/25/23 1107   BP: 126/66   Pulse: 76   Resp: 18       General: Well-developed, well-nourished, in no acute distress  HEENT: Normocephalic, atraumatic, extraocular eye movements in tact  Neck: supple, trachea midline, no cervical or supraclavicular adenopathy  Respirations: Even and unlabored  Abdomen: soft, Non-tender, Non-distended, no palpable masses, no rebound or guarding  Extremities: Moves all extremities equally, atraumatic, no clubbing, cyanosis, trace edema  Skin: warm and dry, no lesions  Psych: normal affect  Neuro: Alert and oriented x 3. Cranial nerves II-XII grossly intact      PSA  4/6/21-0.82  9/27/21-0.29  4/1/22: 0.17  10/5/22: 0.10  4/19/23: <0.01    Assessment:   1. Prostate cancer  Prostate Specific Antigen, Diagnostic      2. Erectile dysfunction following " radiation therapy        3. Type 2 diabetes mellitus without complication, without long-term current use of insulin                Plan:  Prostate cancer  -     Prostate Specific Antigen, Diagnostic; Future; Expected date: 10/25/2023    Erectile dysfunction following radiation therapy    Type 2 diabetes mellitus without complication, without long-term current use of insulin    Other orders  -     tadalafiL (CIALIS) 20 MG Tab; Take 1 tablet (20 mg total) by mouth daily as needed (intercourse).  Dispense: 30 tablet; Refill: 11    PSA decreasing appropriately  continue current diabetic management  Continue cialis PRN.   Follow up in about 6 months (around 10/25/2023) for labs before visit.

## 2023-10-20 ENCOUNTER — LAB VISIT (OUTPATIENT)
Dept: LAB | Facility: HOSPITAL | Age: 83
End: 2023-10-20
Attending: UROLOGY
Payer: MEDICARE

## 2023-10-20 DIAGNOSIS — C61 PROSTATE CANCER: ICD-10-CM

## 2023-10-20 LAB — COMPLEXED PSA SERPL-MCNC: 0.06 NG/ML (ref 0–4)

## 2023-10-20 PROCEDURE — 84153 ASSAY OF PSA TOTAL: CPT | Performed by: UROLOGY

## 2023-10-20 PROCEDURE — 36415 COLL VENOUS BLD VENIPUNCTURE: CPT | Mod: PO | Performed by: UROLOGY

## 2023-10-27 ENCOUNTER — OFFICE VISIT (OUTPATIENT)
Dept: UROLOGY | Facility: CLINIC | Age: 83
End: 2023-10-27
Payer: MEDICARE

## 2023-10-27 VITALS
DIASTOLIC BLOOD PRESSURE: 69 MMHG | HEART RATE: 74 BPM | SYSTOLIC BLOOD PRESSURE: 127 MMHG | BODY MASS INDEX: 26.13 KG/M2 | HEIGHT: 68 IN | WEIGHT: 172.38 LBS | RESPIRATION RATE: 16 BRPM

## 2023-10-27 DIAGNOSIS — C61 PROSTATE CANCER: Primary | ICD-10-CM

## 2023-10-27 DIAGNOSIS — N52.35 ERECTILE DYSFUNCTION FOLLOWING RADIATION THERAPY: ICD-10-CM

## 2023-10-27 PROCEDURE — 1159F PR MEDICATION LIST DOCUMENTED IN MEDICAL RECORD: ICD-10-PCS | Mod: CPTII,S$GLB,, | Performed by: UROLOGY

## 2023-10-27 PROCEDURE — 3078F PR MOST RECENT DIASTOLIC BLOOD PRESSURE < 80 MM HG: ICD-10-PCS | Mod: CPTII,S$GLB,, | Performed by: UROLOGY

## 2023-10-27 PROCEDURE — 3074F PR MOST RECENT SYSTOLIC BLOOD PRESSURE < 130 MM HG: ICD-10-PCS | Mod: CPTII,S$GLB,, | Performed by: UROLOGY

## 2023-10-27 PROCEDURE — 1126F PR PAIN SEVERITY QUANTIFIED, NO PAIN PRESENT: ICD-10-PCS | Mod: CPTII,S$GLB,, | Performed by: UROLOGY

## 2023-10-27 PROCEDURE — 99999 PR PBB SHADOW E&M-EST. PATIENT-LVL III: ICD-10-PCS | Mod: PBBFAC,,, | Performed by: UROLOGY

## 2023-10-27 PROCEDURE — 1101F PR PT FALLS ASSESS DOC 0-1 FALLS W/OUT INJ PAST YR: ICD-10-PCS | Mod: CPTII,S$GLB,, | Performed by: UROLOGY

## 2023-10-27 PROCEDURE — 3078F DIAST BP <80 MM HG: CPT | Mod: CPTII,S$GLB,, | Performed by: UROLOGY

## 2023-10-27 PROCEDURE — 3074F SYST BP LT 130 MM HG: CPT | Mod: CPTII,S$GLB,, | Performed by: UROLOGY

## 2023-10-27 PROCEDURE — 1126F AMNT PAIN NOTED NONE PRSNT: CPT | Mod: CPTII,S$GLB,, | Performed by: UROLOGY

## 2023-10-27 PROCEDURE — 1159F MED LIST DOCD IN RCRD: CPT | Mod: CPTII,S$GLB,, | Performed by: UROLOGY

## 2023-10-27 PROCEDURE — 1101F PT FALLS ASSESS-DOCD LE1/YR: CPT | Mod: CPTII,S$GLB,, | Performed by: UROLOGY

## 2023-10-27 PROCEDURE — 99214 OFFICE O/P EST MOD 30 MIN: CPT | Mod: S$GLB,,, | Performed by: UROLOGY

## 2023-10-27 PROCEDURE — 99999 PR PBB SHADOW E&M-EST. PATIENT-LVL III: CPT | Mod: PBBFAC,,, | Performed by: UROLOGY

## 2023-10-27 PROCEDURE — 3288F FALL RISK ASSESSMENT DOCD: CPT | Mod: CPTII,S$GLB,, | Performed by: UROLOGY

## 2023-10-27 PROCEDURE — 3288F PR FALLS RISK ASSESSMENT DOCUMENTED: ICD-10-PCS | Mod: CPTII,S$GLB,, | Performed by: UROLOGY

## 2023-10-27 PROCEDURE — 99214 PR OFFICE/OUTPT VISIT, EST, LEVL IV, 30-39 MIN: ICD-10-PCS | Mod: S$GLB,,, | Performed by: UROLOGY

## 2023-10-27 NOTE — PROGRESS NOTES
CC: follow up prostate cancer    History of Present Illness:   Brock Camara is a 83 y.o. male here for follow up.     10/27/23-Sometimes he has urgency, but no UUI. No stranguria, dysuria or gross hematuria. Uses cialis very seldom. Works okay, but takes some time.   4/25/23-no urgency or gross hematuria. No dysuria. Cialis works well. Stream is fair. No stranguria. Diarrhea cleared up-- was related to protonix.   10/18/22- No urgency or gross hematuria. Occasional dysuria, but not bad. Cialis working well. Energy varies. No bone pain. Still with some diarrhea post-XRT.   4/5/22-Good stream. No gross hematuria. Still has diarrhea. No urinary urgency. No longer on flomax. Still with ED. viagra didn't work well. Reports diabetic control is fair.   10/5/21-On flomax. Reports dysuria every time he urinates. No gross hematuria. Drinking cranberry juice, which helps. Stream is weak.   8/6/21: Pt reports retrograde ejaculation, ED and a growth on the side of his scrotum. He states that he ejaculated in June and had a severe pain in his Left groin. Restarted flomax, but wasn't able to ejaculate even before the flomax. After restarting the flomax, ejaculated, but no pain, not much semen comes out. Hasn't really ejaculated much since radiation. Denies stranguria. Slight dysuria, but not much. Has some initial dribbling of his stream, but then it strengthens. Had a colonoscopy and EUS. Had 5 polyps. Still has Fecal Urgency.   4/6/21: Brock Camara is a 81 y.o. male here for follow up. He has a h/o [place] 4+3 prostate adenocarcinoma, diagnosed 5/2020 with a PSA of 7.6. Completed XRT 8/2020 with Dr. Carney. No ADT. Urinating okay. Slight dysuria. No gross hematuria. Stopped taking flomax around September because he wasn't having any pain. Also has fecal urgency and FI. States that he hasn't been back to see his Gastroenterologist, but needs to, due to h/o 16 polyps on last colonoscopy.     Review of Systems  "  Constitutional:  Negative for chills and fever.   Respiratory:  Negative for shortness of breath.    Cardiovascular:  Negative for chest pain.   Gastrointestinal:  Negative for abdominal pain, diarrhea and rectal pain.   Genitourinary:  Negative for difficulty urinating and hematuria.   All other systems reviewed and are negative.      Current Outpatient Medications   Medication Sig Dispense Refill    glimepiride (AMARYL) 2 MG tablet Take 2 mg by mouth before breakfast.      JARDIANCE 25 mg tablet Take 25 mg by mouth once daily.      losartan (COZAAR) 100 MG tablet Take 100 mg by mouth once daily.      metFORMIN (GLUCOPHAGE) 1000 MG tablet 1,000 mg 2 (two) times a day.      simvastatin (ZOCOR) 40 MG tablet Take 40 mg by mouth once daily.      tadalafiL (CIALIS) 20 MG Tab Take 1 tablet (20 mg total) by mouth daily as needed (intercourse). 30 tablet 11    omeprazole (PRILOSEC) 40 MG capsule Take 40 mg by mouth once daily.       No current facility-administered medications for this visit.       Review of patient's allergies indicates:   Allergen Reactions    Adhesive      Pt states allergic to "Paper Tape" = rash       Past medical, family, and social history reviewed as documented in chart with pertinent positive medical, family, and social history detailed in HPI.    Physical Exam  Vitals:    10/27/23 1125   BP: 127/69   Pulse: 74   Resp: 16       General: Well-developed, well-nourished, in no acute distress  HEENT: Normocephalic, atraumatic, extraocular eye movements in tact  Neck: supple, trachea midline, no cervical or supraclavicular adenopathy  Respirations: Even and unlabored  Abdomen: soft, Non-tender, Non-distended, no palpable masses, no rebound or guarding  Extremities: Moves all extremities equally, atraumatic, no clubbing, cyanosis, trace edema  Skin: warm and dry, no lesions  Psych: normal affect  Neuro: Alert and oriented x 3. Cranial nerves II-XII grossly " intact      PSA  4/6/21-0.82  9/27/21-0.29  4/1/22: 0.17  10/5/22: 0.10  4/19/23: <0.01  10/20/23: 0.06    Assessment:   1. Prostate cancer  Prostate Specific Antigen, Diagnostic      2. Erectile dysfunction following radiation therapy                  Plan:  Prostate cancer  -     Prostate Specific Antigen, Diagnostic; Future; Expected date: 04/27/2024    Erectile dysfunction following radiation therapy    Continue cialis PRN    Follow up in about 6 months (around 4/27/2024) for labs before visit.

## 2024-04-29 ENCOUNTER — LAB VISIT (OUTPATIENT)
Dept: LAB | Facility: HOSPITAL | Age: 84
End: 2024-04-29
Attending: UROLOGY
Payer: MEDICARE

## 2024-04-29 DIAGNOSIS — C61 PROSTATE CANCER: ICD-10-CM

## 2024-04-29 LAB — COMPLEXED PSA SERPL-MCNC: 0.05 NG/ML (ref 0–4)

## 2024-04-29 PROCEDURE — 84153 ASSAY OF PSA TOTAL: CPT | Performed by: UROLOGY

## 2024-04-29 PROCEDURE — 36415 COLL VENOUS BLD VENIPUNCTURE: CPT | Mod: PO | Performed by: UROLOGY

## 2024-05-01 ENCOUNTER — OFFICE VISIT (OUTPATIENT)
Dept: UROLOGY | Facility: CLINIC | Age: 84
End: 2024-05-01
Payer: MEDICARE

## 2024-05-01 VITALS
HEART RATE: 65 BPM | HEIGHT: 68 IN | TEMPERATURE: 98 F | WEIGHT: 170.19 LBS | DIASTOLIC BLOOD PRESSURE: 65 MMHG | SYSTOLIC BLOOD PRESSURE: 127 MMHG | BODY MASS INDEX: 25.79 KG/M2 | RESPIRATION RATE: 17 BRPM

## 2024-05-01 DIAGNOSIS — N52.35 ERECTILE DYSFUNCTION FOLLOWING RADIATION THERAPY: ICD-10-CM

## 2024-05-01 DIAGNOSIS — C61 PROSTATE CANCER: Primary | ICD-10-CM

## 2024-05-01 LAB
BILIRUB UR QL STRIP: NEGATIVE
GLUCOSE UR QL STRIP: POSITIVE
KETONES UR QL STRIP: NEGATIVE
LEUKOCYTE ESTERASE UR QL STRIP: NEGATIVE
PH, POC UA: 5.5
POC BLOOD, URINE: NEGATIVE
POC NITRATES, URINE: NEGATIVE
PROT UR QL STRIP: NEGATIVE
SP GR UR STRIP: <=1.005 (ref 1–1.03)
UROBILINOGEN UR STRIP-ACNC: 0.2 (ref 0.3–2.2)

## 2024-05-01 PROCEDURE — 3078F DIAST BP <80 MM HG: CPT | Mod: CPTII,S$GLB,, | Performed by: UROLOGY

## 2024-05-01 PROCEDURE — 99213 OFFICE O/P EST LOW 20 MIN: CPT | Mod: S$GLB,,, | Performed by: UROLOGY

## 2024-05-01 PROCEDURE — 3288F FALL RISK ASSESSMENT DOCD: CPT | Mod: CPTII,S$GLB,, | Performed by: UROLOGY

## 2024-05-01 PROCEDURE — 1101F PT FALLS ASSESS-DOCD LE1/YR: CPT | Mod: CPTII,S$GLB,, | Performed by: UROLOGY

## 2024-05-01 PROCEDURE — 81003 URINALYSIS AUTO W/O SCOPE: CPT | Mod: QW,S$GLB,, | Performed by: UROLOGY

## 2024-05-01 PROCEDURE — 1126F AMNT PAIN NOTED NONE PRSNT: CPT | Mod: CPTII,S$GLB,, | Performed by: UROLOGY

## 2024-05-01 PROCEDURE — 3074F SYST BP LT 130 MM HG: CPT | Mod: CPTII,S$GLB,, | Performed by: UROLOGY

## 2024-05-01 PROCEDURE — 99999 PR PBB SHADOW E&M-EST. PATIENT-LVL III: CPT | Mod: PBBFAC,,, | Performed by: UROLOGY

## 2024-05-01 PROCEDURE — 1159F MED LIST DOCD IN RCRD: CPT | Mod: CPTII,S$GLB,, | Performed by: UROLOGY

## 2024-05-01 RX ORDER — TADALAFIL 20 MG/1
20 TABLET ORAL DAILY PRN
Qty: 30 TABLET | Refills: 11 | Status: CANCELLED | OUTPATIENT
Start: 2024-05-01 | End: 2025-05-01

## 2024-05-01 NOTE — PROGRESS NOTES
CC: follow up prostate cancer    History of Present Illness:   Brock Camara is a 84 y.o. male here for follow up.     5/1/24-not using cialis for ED. He has some urgency and frequency. Also reports a weak stream at times. No nocturia. No gross hematuria.   10/27/23-Sometimes he has urgency, but no UUI. No stranguria, dysuria or gross hematuria. Uses cialis very seldom. Works okay, but takes some time.   4/25/23-no urgency or gross hematuria. No dysuria. Cialis works well. Stream is fair. No stranguria. Diarrhea cleared up-- was related to protonix.   10/18/22- No urgency or gross hematuria. Occasional dysuria, but not bad. Cialis working well. Energy varies. No bone pain. Still with some diarrhea post-XRT.   4/5/22-Good stream. No gross hematuria. Still has diarrhea. No urinary urgency. No longer on flomax. Still with ED. viagra didn't work well. Reports diabetic control is fair.   10/5/21-On flomax. Reports dysuria every time he urinates. No gross hematuria. Drinking cranberry juice, which helps. Stream is weak.   8/6/21: Pt reports retrograde ejaculation, ED and a growth on the side of his scrotum. He states that he ejaculated in June and had a severe pain in his Left groin. Restarted flomax, but wasn't able to ejaculate even before the flomax. After restarting the flomax, ejaculated, but no pain, not much semen comes out. Hasn't really ejaculated much since radiation. Denies stranguria. Slight dysuria, but not much. Has some initial dribbling of his stream, but then it strengthens. Had a colonoscopy and EUS. Had 5 polyps. Still has Fecal Urgency.   4/6/21: Brock Camara is a 81 y.o. male here for follow up. He has a h/o [place] 4+3 prostate adenocarcinoma, diagnosed 5/2020 with a PSA of 7.6. Completed XRT 8/2020 with Dr. Carney. No ADT. Urinating okay. Slight dysuria. No gross hematuria. Stopped taking flomax around September because he wasn't having any pain. Also has fecal urgency and FI. States that he  "hasn't been back to see his Gastroenterologist, but needs to, due to h/o 16 polyps on last colonoscopy.     Review of Systems   Constitutional:  Negative for chills and fever.   Respiratory:  Negative for shortness of breath.    Cardiovascular:  Negative for chest pain.   Gastrointestinal:  Negative for abdominal pain, diarrhea and rectal pain.   Genitourinary:  Negative for difficulty urinating and hematuria.   All other systems reviewed and are negative.      Current Outpatient Medications   Medication Sig Dispense Refill    glimepiride (AMARYL) 2 MG tablet Take 2 mg by mouth before breakfast.      JARDIANCE 25 mg tablet Take 25 mg by mouth once daily.      losartan (COZAAR) 100 MG tablet Take 100 mg by mouth once daily.      metFORMIN (GLUCOPHAGE) 1000 MG tablet 1,000 mg 2 (two) times a day.      simvastatin (ZOCOR) 40 MG tablet Take 40 mg by mouth once daily.      omeprazole (PRILOSEC) 40 MG capsule Take 40 mg by mouth once daily. (Patient not taking: Reported on 5/1/2024)       No current facility-administered medications for this visit.       Review of patient's allergies indicates:   Allergen Reactions    Adhesive      Pt states allergic to "Paper Tape" = rash       Past medical, family, and social history reviewed as documented in chart with pertinent positive medical, family, and social history detailed in HPI.    Physical Exam  Vitals:    05/01/24 1114   BP: 127/65   Pulse: 65   Resp: 17   Temp: 97.9 °F (36.6 °C)       General: Well-developed, well-nourished, in no acute distress  HEENT: Normocephalic, atraumatic, extraocular eye movements in tact  Neck: supple, trachea midline, no cervical or supraclavicular adenopathy  Respirations: Even and unlabored  Abdomen: soft, Non-tender, Non-distended, no palpable masses, no rebound or guarding  Extremities: Moves all extremities equally, atraumatic, no clubbing, cyanosis, trace edema  Skin: warm and dry, no lesions  Psych: normal affect  Neuro: Alert and " oriented x 3. Cranial nerves II-XII grossly intact      PSA  4/6/21-0.82  9/27/21-0.29  4/1/22: 0.17  10/5/22: 0.10  4/19/23: <0.01  10/20/23: 0.06  4/29/24: 0.05    Assessment:   1. Prostate cancer  Prostate Specific Antigen, Diagnostic      2. Erectile dysfunction following radiation therapy  POCT Urinalysis, Dipstick, Automated, W/O Scope                Plan:  Prostate cancer  -     Prostate Specific Antigen, Diagnostic; Future; Expected date: 11/01/2024    Erectile dysfunction following radiation therapy  -     POCT Urinalysis, Dipstick, Automated, W/O Scope    Not currently bothered by ED. He will contact if he would like a refill of cialis.     Follow up in about 6 months (around 11/1/2024) for labs before visit.

## 2024-10-07 ENCOUNTER — PATIENT MESSAGE (OUTPATIENT)
Dept: RESEARCH | Facility: HOSPITAL | Age: 84
End: 2024-10-07
Payer: MEDICARE

## 2024-10-17 ENCOUNTER — OFFICE VISIT (OUTPATIENT)
Dept: UROLOGY | Facility: CLINIC | Age: 84
End: 2024-10-17
Payer: MEDICARE

## 2024-10-17 VITALS — HEIGHT: 68 IN | WEIGHT: 170.19 LBS | BODY MASS INDEX: 25.79 KG/M2

## 2024-10-17 DIAGNOSIS — C61 PROSTATE CANCER: Primary | ICD-10-CM

## 2024-10-17 DIAGNOSIS — R33.9 URINARY RETENTION: ICD-10-CM

## 2024-10-17 PROCEDURE — 3288F FALL RISK ASSESSMENT DOCD: CPT | Mod: CPTII,S$GLB,, | Performed by: UROLOGY

## 2024-10-17 PROCEDURE — 99214 OFFICE O/P EST MOD 30 MIN: CPT | Mod: S$GLB,,, | Performed by: UROLOGY

## 2024-10-17 PROCEDURE — 99999 PR PBB SHADOW E&M-EST. PATIENT-LVL III: CPT | Mod: PBBFAC,,, | Performed by: UROLOGY

## 2024-10-17 PROCEDURE — 1126F AMNT PAIN NOTED NONE PRSNT: CPT | Mod: CPTII,S$GLB,, | Performed by: UROLOGY

## 2024-10-17 PROCEDURE — 1101F PT FALLS ASSESS-DOCD LE1/YR: CPT | Mod: CPTII,S$GLB,, | Performed by: UROLOGY

## 2024-10-17 PROCEDURE — 1159F MED LIST DOCD IN RCRD: CPT | Mod: CPTII,S$GLB,, | Performed by: UROLOGY

## 2024-10-17 NOTE — PROGRESS NOTES
"Chief Complaint:  Urinary retention  HPI:  HPI Brock Camara justina 84 y.o. male who presents with urinary retention.  [facility] and has had a catheter for about a week and a half.  He developed retention while he was in the hospital and failed a voiding trial.  He was known history of prostate cancer status post radiation therapy.  He follows with Dr. Masters regularly.  He is here with the son today.  He was moving better and bowels are moving.  He was on tamsulosin but developed severe hypotension and this had to be discontinued.    History:  Social History     Tobacco Use    Smoking status: Former    Smokeless tobacco: Never   Substance Use Topics    Alcohol use: Not Currently    Drug use: Never     History reviewed. No pertinent past medical history.  History reviewed. No pertinent surgical history.  No family history on file.    Current Outpatient Medications on File Prior to Visit   Medication Sig Dispense Refill    glimepiride (AMARYL) 2 MG tablet Take 2 mg by mouth before breakfast.      JARDIANCE 25 mg tablet Take 25 mg by mouth once daily.      losartan (COZAAR) 100 MG tablet Take 100 mg by mouth once daily.      metFORMIN (GLUCOPHAGE) 1000 MG tablet 1,000 mg 2 (two) times a day.      omeprazole (PRILOSEC) 40 MG capsule Take 40 mg by mouth once daily.      simvastatin (ZOCOR) 40 MG tablet Take 40 mg by mouth once daily.       No current facility-administered medications on file prior to visit.        Objective:     Vitals:    10/17/24 1137   Weight: 77.2 kg (170 lb 3.1 oz)   Height: 5' 8" (1.727 m)      BMI Readings from Last 1 Encounters:   10/17/24 25.88 kg/m²          Physical Exam  No acute distress alert and oriented   Respirations even unlabored   Abdomen is soft nontender    Lab Results   Component Value Date    PSADIAG 0.05 04/29/2024    PSADIAG 0.06 10/20/2023    PSADIAG <0.01 04/19/2023    PSADIAG 0.10 10/05/2022    PSADIAG 0.17 04/01/2022    PSADIAG 0.29 " 09/27/2021    PSADIAG 0.82 04/06/2021    PSA 7.6 (H) 02/25/2020        Lab Results   Component Value Date    CREATININE 0.82 10/07/2024      Assessment:       1. Prostate cancer    2. Urinary retention        Plan:     1. Prostate cancer    2. Urinary retention           Prostate cancer with very low PSAs after treatment with the radiation therapy.  Urinary retention after a fall.  We will recommend voiding trial.  Discussed voiding trial today however it being later in the day patient was concerned about going back to the ER to get a catheter replaced.  Patient will be scheduled for an a.m. Bess removal in [place] with afternoon appointment with Dr. Masters with a postvoid residual.

## 2024-10-23 ENCOUNTER — LAB VISIT (OUTPATIENT)
Dept: LAB | Facility: HOSPITAL | Age: 84
End: 2024-10-23
Attending: UROLOGY
Payer: MEDICARE

## 2024-10-23 ENCOUNTER — OFFICE VISIT (OUTPATIENT)
Dept: UROLOGY | Facility: CLINIC | Age: 84
End: 2024-10-23
Payer: MEDICARE

## 2024-10-23 ENCOUNTER — CLINICAL SUPPORT (OUTPATIENT)
Dept: UROLOGY | Facility: CLINIC | Age: 84
End: 2024-10-23
Payer: MEDICARE

## 2024-10-23 VITALS
SYSTOLIC BLOOD PRESSURE: 150 MMHG | RESPIRATION RATE: 18 BRPM | DIASTOLIC BLOOD PRESSURE: 71 MMHG | WEIGHT: 162.25 LBS | BODY MASS INDEX: 24.59 KG/M2 | HEIGHT: 68 IN | TEMPERATURE: 98 F | HEART RATE: 79 BPM

## 2024-10-23 DIAGNOSIS — C61 PROSTATE CANCER: ICD-10-CM

## 2024-10-23 DIAGNOSIS — R33.9 URINARY RETENTION: Primary | ICD-10-CM

## 2024-10-23 LAB
BILIRUB UR QL STRIP: NEGATIVE
GLUCOSE UR QL STRIP: POSITIVE
KETONES UR QL STRIP: NEGATIVE
LEUKOCYTE ESTERASE UR QL STRIP: POSITIVE
PH, POC UA: 5.5
POC BLOOD, URINE: POSITIVE
POC NITRATES, URINE: NEGATIVE
POC RESIDUAL URINE VOLUME: 0 ML (ref 0–100)
POC RESIDUAL URINE VOLUME: 389 ML (ref 0–100)
PROT UR QL STRIP: NEGATIVE
SP GR UR STRIP: <=1.005 (ref 1–1.03)
UROBILINOGEN UR STRIP-ACNC: 0.2 (ref 0.3–2.2)

## 2024-10-23 PROCEDURE — 51798 US URINE CAPACITY MEASURE: CPT | Mod: S$GLB,,, | Performed by: UROLOGY

## 2024-10-23 PROCEDURE — 84153 ASSAY OF PSA TOTAL: CPT | Performed by: UROLOGY

## 2024-10-23 PROCEDURE — 3077F SYST BP >= 140 MM HG: CPT | Mod: CPTII,S$GLB,, | Performed by: UROLOGY

## 2024-10-23 PROCEDURE — 1101F PT FALLS ASSESS-DOCD LE1/YR: CPT | Mod: CPTII,S$GLB,, | Performed by: UROLOGY

## 2024-10-23 PROCEDURE — 1126F AMNT PAIN NOTED NONE PRSNT: CPT | Mod: CPTII,S$GLB,, | Performed by: UROLOGY

## 2024-10-23 PROCEDURE — 99999 PR PBB SHADOW E&M-EST. PATIENT-LVL III: CPT | Mod: PBBFAC,,, | Performed by: UROLOGY

## 2024-10-23 PROCEDURE — 3078F DIAST BP <80 MM HG: CPT | Mod: CPTII,S$GLB,, | Performed by: UROLOGY

## 2024-10-23 PROCEDURE — 99213 OFFICE O/P EST LOW 20 MIN: CPT | Mod: S$GLB,,, | Performed by: UROLOGY

## 2024-10-23 PROCEDURE — 3288F FALL RISK ASSESSMENT DOCD: CPT | Mod: CPTII,S$GLB,, | Performed by: UROLOGY

## 2024-10-23 PROCEDURE — 1159F MED LIST DOCD IN RCRD: CPT | Mod: CPTII,S$GLB,, | Performed by: UROLOGY

## 2024-10-23 PROCEDURE — 87086 URINE CULTURE/COLONY COUNT: CPT | Performed by: UROLOGY

## 2024-10-23 PROCEDURE — 36415 COLL VENOUS BLD VENIPUNCTURE: CPT | Mod: PN | Performed by: UROLOGY

## 2024-10-23 PROCEDURE — 81003 URINALYSIS AUTO W/O SCOPE: CPT | Mod: QW,S$GLB,, | Performed by: UROLOGY

## 2024-10-23 NOTE — PROGRESS NOTES
INDWELLING CATHETER REMOVAL:    Patient placed on table in supine position. Catheter bag seen draining clear yellow urine. Catheter removed from bag. 10cc sterile water deflated from catheter balloon. Indwelling catheter removed from bladder. Patient tolerated well. Patient instructed to drink plenty water and void on urgency. Patient notified to return to Dr. Masters's office for 1:30PM where we will conduct bladder scan(post-void residual).

## 2024-10-23 NOTE — PROGRESS NOTES
CC: follow up prostate cancer    History of Present Illness:   Brock Camara is a 84 y.o. male here for follow up.     10/23/24-pt developed urinary retention while hospitalized at [facility] after a fall. Bess removed this am. He has urinated a few times today. He was started on flomax while in the hospital but Dr. Reyes took him off due to hypotension. He was also taken off of his losartan. Denies any issues urinating before falling. Thinks the difficulty was positional. No gross hematuria or dysuria.   5/1/24-not using cialis for ED. He has some urgency and frequency. Also reports a weak stream at times. No nocturia. No gross hematuria.   10/27/23-Sometimes he has urgency, but no UUI. No stranguria, dysuria or gross hematuria. Uses cialis very seldom. Works okay, but takes some time.   4/25/23-no urgency or gross hematuria. No dysuria. Cialis works well. Stream is fair. No stranguria. Diarrhea cleared up-- was related to protonix.   10/18/22- No urgency or gross hematuria. Occasional dysuria, but not bad. Cialis working well. Energy varies. No bone pain. Still with some diarrhea post-XRT.   4/5/22-Good stream. No gross hematuria. Still has diarrhea. No urinary urgency. No longer on flomax. Still with ED. viagra didn't work well. Reports diabetic control is fair.   10/5/21-On flomax. Reports dysuria every time he urinates. No gross hematuria. Drinking cranberry juice, which helps. Stream is weak.   8/6/21: Pt reports retrograde ejaculation, ED and a growth on the side of his scrotum. He states that he ejaculated in June and had a severe pain in his Left groin. Restarted flomax, but wasn't able to ejaculate even before the flomax. After restarting the flomax, ejaculated, but no pain, not much semen comes out. Hasn't really ejaculated much since radiation. Denies stranguria. Slight dysuria, but not much. Has some initial dribbling of his stream, but then it strengthens. Had a colonoscopy and EUS. Had 5 polyps. Still  "has Fecal Urgency.   4/6/21: Brock Camara is a 81 y.o. male here for follow up. He has a h/o [place] 4+3 prostate adenocarcinoma, diagnosed 5/2020 with a PSA of 7.6. Completed XRT 8/2020 with Dr. Carney. No ADT. Urinating okay. Slight dysuria. No gross hematuria. Stopped taking flomax around September because he wasn't having any pain. Also has fecal urgency and FI. States that he hasn't been back to see his Gastroenterologist, but needs to, due to h/o 16 polyps on last colonoscopy.     Review of Systems   Constitutional:  Negative for chills and fever.   Respiratory:  Negative for shortness of breath.    Cardiovascular:  Negative for chest pain.   Gastrointestinal:  Negative for abdominal pain, diarrhea and rectal pain.   Genitourinary:  Negative for difficulty urinating and hematuria.   All other systems reviewed and are negative.      Current Outpatient Medications   Medication Sig Dispense Refill    glimepiride (AMARYL) 2 MG tablet Take 2 mg by mouth before breakfast.      JARDIANCE 25 mg tablet Take 25 mg by mouth once daily.      losartan (COZAAR) 100 MG tablet Take 100 mg by mouth once daily.      metFORMIN (GLUCOPHAGE) 1000 MG tablet 1,000 mg 2 (two) times a day.      omeprazole (PRILOSEC) 40 MG capsule Take 40 mg by mouth once daily.      simvastatin (ZOCOR) 40 MG tablet Take 40 mg by mouth once daily.       No current facility-administered medications for this visit.       Review of patient's allergies indicates:   Allergen Reactions    Adhesive      Pt states allergic to "Paper Tape" = rash       Past medical, family, and social history reviewed as documented in chart with pertinent positive medical, family, and social history detailed in HPI.    Physical Exam  Vitals:    10/23/24 1411   BP: (!) 150/71   Pulse: 79   Resp: 18   Temp: 98.1 °F (36.7 °C)         General: Well-developed, well-nourished, in no acute distress  HEENT: Normocephalic, atraumatic, extraocular eye movements in tact  Neck: " supple, trachea midline, no cervical or supraclavicular adenopathy  Respirations: Even and unlabored  Extremities: Moves all extremities equally, atraumatic, no clubbing, cyanosis, trace edema  Skin: warm and dry, no lesions  Psych: normal affect  Neuro: Alert and oriented x 3. Cranial nerves II-XII grossly intact    Bladder scan: 389cc on arrival, urinated and 0cc PVR    PSA  4/6/21-0.82  9/27/21-0.29  4/1/22: 0.17  10/5/22: 0.10  4/19/23: <0.01  10/20/23: 0.06  4/29/24: 0.05    Assessment:   1. Urinary retention  POCT Urinalysis, Dipstick, Automated, W/O Scope    POCT Bladder Scan    Urine culture      2. Prostate cancer                    Plan:  Urinary retention  -     POCT Urinalysis, Dipstick, Automated, W/O Scope  -     POCT Bladder Scan  -     Urine culture    Prostate cancer    Discussed that if retention issues should arise again, could consider UroLift.   Pt to complete PSA prior to next visit.     Follow up in about 2 weeks (around 11/6/2024).

## 2024-10-24 LAB — COMPLEXED PSA SERPL-MCNC: 0.05 NG/ML (ref 0–4)

## 2024-10-25 LAB
BACTERIA UR CULT: NORMAL
BACTERIA UR CULT: NORMAL

## 2024-11-05 ENCOUNTER — OFFICE VISIT (OUTPATIENT)
Dept: UROLOGY | Facility: CLINIC | Age: 84
End: 2024-11-05
Payer: MEDICARE

## 2024-11-05 VITALS
DIASTOLIC BLOOD PRESSURE: 71 MMHG | BODY MASS INDEX: 22.55 KG/M2 | HEART RATE: 76 BPM | SYSTOLIC BLOOD PRESSURE: 120 MMHG | RESPIRATION RATE: 17 BRPM | WEIGHT: 148.81 LBS | HEIGHT: 68 IN | TEMPERATURE: 98 F

## 2024-11-05 DIAGNOSIS — N30.00 ACUTE CYSTITIS WITHOUT HEMATURIA: ICD-10-CM

## 2024-11-05 DIAGNOSIS — R33.9 URINARY RETENTION: Primary | ICD-10-CM

## 2024-11-05 DIAGNOSIS — N13.8 BPH WITH OBSTRUCTION/LOWER URINARY TRACT SYMPTOMS: ICD-10-CM

## 2024-11-05 DIAGNOSIS — N40.1 BPH WITH OBSTRUCTION/LOWER URINARY TRACT SYMPTOMS: ICD-10-CM

## 2024-11-05 DIAGNOSIS — C61 PROSTATE CANCER: ICD-10-CM

## 2024-11-05 LAB
BILIRUB UR QL STRIP: NEGATIVE
GLUCOSE UR QL STRIP: POSITIVE
KETONES UR QL STRIP: NEGATIVE
LEUKOCYTE ESTERASE UR QL STRIP: POSITIVE
PH, POC UA: 5
POC BLOOD, URINE: POSITIVE
POC NITRATES, URINE: NEGATIVE
POC RESIDUAL URINE VOLUME: 58 ML (ref 0–100)
PROT UR QL STRIP: NEGATIVE
SP GR UR STRIP: 1.01 (ref 1–1.03)
UROBILINOGEN UR STRIP-ACNC: 0.2 (ref 0.3–2.2)

## 2024-11-05 PROCEDURE — 99214 OFFICE O/P EST MOD 30 MIN: CPT | Mod: S$GLB,,, | Performed by: UROLOGY

## 2024-11-05 PROCEDURE — 1159F MED LIST DOCD IN RCRD: CPT | Mod: CPTII,S$GLB,, | Performed by: UROLOGY

## 2024-11-05 PROCEDURE — 1126F AMNT PAIN NOTED NONE PRSNT: CPT | Mod: CPTII,S$GLB,, | Performed by: UROLOGY

## 2024-11-05 PROCEDURE — 81003 URINALYSIS AUTO W/O SCOPE: CPT | Mod: QW,S$GLB,, | Performed by: UROLOGY

## 2024-11-05 PROCEDURE — 87086 URINE CULTURE/COLONY COUNT: CPT | Performed by: UROLOGY

## 2024-11-05 PROCEDURE — 51798 US URINE CAPACITY MEASURE: CPT | Mod: S$GLB,,, | Performed by: UROLOGY

## 2024-11-05 PROCEDURE — 1101F PT FALLS ASSESS-DOCD LE1/YR: CPT | Mod: CPTII,S$GLB,, | Performed by: UROLOGY

## 2024-11-05 PROCEDURE — 3074F SYST BP LT 130 MM HG: CPT | Mod: CPTII,S$GLB,, | Performed by: UROLOGY

## 2024-11-05 PROCEDURE — 3078F DIAST BP <80 MM HG: CPT | Mod: CPTII,S$GLB,, | Performed by: UROLOGY

## 2024-11-05 PROCEDURE — 87186 SC STD MICRODIL/AGAR DIL: CPT | Mod: 59 | Performed by: UROLOGY

## 2024-11-05 PROCEDURE — 3288F FALL RISK ASSESSMENT DOCD: CPT | Mod: CPTII,S$GLB,, | Performed by: UROLOGY

## 2024-11-05 PROCEDURE — 87088 URINE BACTERIA CULTURE: CPT | Performed by: UROLOGY

## 2024-11-05 PROCEDURE — 99999 PR PBB SHADOW E&M-EST. PATIENT-LVL III: CPT | Mod: PBBFAC,,, | Performed by: UROLOGY

## 2024-11-05 RX ORDER — CEFDINIR 300 MG/1
300 CAPSULE ORAL 2 TIMES DAILY
Qty: 14 CAPSULE | Refills: 0 | Status: SHIPPED | OUTPATIENT
Start: 2024-11-05 | End: 2024-11-12

## 2024-11-05 RX ORDER — TAMSULOSIN HYDROCHLORIDE 0.4 MG/1
0.4 CAPSULE ORAL DAILY
Qty: 30 CAPSULE | Refills: 11 | Status: SHIPPED | OUTPATIENT
Start: 2024-11-05 | End: 2025-11-05

## 2024-11-05 NOTE — PROGRESS NOTES
CC: follow up prostate cancer    History of Present Illness:   Brock Camara is a 84 y.o. male here for follow up.     11/5/24-Pt reports that he has been having dysuria and frequency/urgency. He also complains of UUI and malaise. Since catheter has been removed, he has tania voiding smaller amounts, only about 150cc at a time. He states that he is now off of his antihypertensives and would like to consider restarting flomax.     10/23/24-pt developed urinary retention while hospitalized at [facility] after a fall. Bess removed this am. He has urinated a few times today. He was started on flomax while in the hospital but Dr. Reyes took him off due to hypotension. He was also taken off of his losartan. Denies any issues urinating before falling. Thinks the difficulty was positional. No gross hematuria or dysuria.   5/1/24-not using cialis for ED. He has some urgency and frequency. Also reports a weak stream at times. No nocturia. No gross hematuria.   10/27/23-Sometimes he has urgency, but no UUI. No stranguria, dysuria or gross hematuria. Uses cialis very seldom. Works okay, but takes some time.   4/25/23-no urgency or gross hematuria. No dysuria. Cialis works well. Stream is fair. No stranguria. Diarrhea cleared up-- was related to protonix.   10/18/22- No urgency or gross hematuria. Occasional dysuria, but not bad. Cialis working well. Energy varies. No bone pain. Still with some diarrhea post-XRT.   4/5/22-Good stream. No gross hematuria. Still has diarrhea. No urinary urgency. No longer on flomax. Still with ED. viagra didn't work well. Reports diabetic control is fair.   10/5/21-On flomax. Reports dysuria every time he urinates. No gross hematuria. Drinking cranberry juice, which helps. Stream is weak.   8/6/21: Pt reports retrograde ejaculation, ED and a growth on the side of his scrotum. He states that he ejaculated in June and had a severe pain in his Left groin. Restarted flomax, but wasn't able to ejaculate  even before the flomax. After restarting the flomax, ejaculated, but no pain, not much semen comes out. Hasn't really ejaculated much since radiation. Denies stranguria. Slight dysuria, but not much. Has some initial dribbling of his stream, but then it strengthens. Had a colonoscopy and EUS. Had 5 polyps. Still has Fecal Urgency.   4/6/21: Brock Camara is a 81 y.o. male here for follow up. He has a h/o [place] 4+3 prostate adenocarcinoma, diagnosed 5/2020 with a PSA of 7.6. Completed XRT 8/2020 with Dr. Carney. No ADT. Urinating okay. Slight dysuria. No gross hematuria. Stopped taking flomax around September because he wasn't having any pain. Also has fecal urgency and FI. States that he hasn't been back to see his Gastroenterologist, but needs to, due to h/o 16 polyps on last colonoscopy.     Review of Systems   Constitutional:  Negative for chills and fever.   Respiratory:  Negative for shortness of breath.    Cardiovascular:  Negative for chest pain.   Gastrointestinal:  Negative for abdominal pain, diarrhea and rectal pain.   Genitourinary:  Negative for difficulty urinating and hematuria.   All other systems reviewed and are negative.      Current Outpatient Medications   Medication Sig Dispense Refill    glimepiride (AMARYL) 2 MG tablet Take 2 mg by mouth before breakfast.      JARDIANCE 25 mg tablet Take 25 mg by mouth once daily.      metFORMIN (GLUCOPHAGE) 1000 MG tablet 1,000 mg 2 (two) times a day.      omeprazole (PRILOSEC) 40 MG capsule Take 40 mg by mouth once daily.      simvastatin (ZOCOR) 40 MG tablet Take 40 mg by mouth once daily.      cefdinir (OMNICEF) 300 MG capsule Take 1 capsule (300 mg total) by mouth 2 (two) times daily. for 7 days 14 capsule 0    losartan (COZAAR) 100 MG tablet Take 100 mg by mouth once daily. (Patient not taking: Reported on 11/5/2024)      tamsulosin (FLOMAX) 0.4 mg Cap Take 1 capsule (0.4 mg total) by mouth once daily. 30 capsule 11     No current  "facility-administered medications for this visit.       Review of patient's allergies indicates:   Allergen Reactions    Adhesive      Pt states allergic to "Paper Tape" = rash       Past medical, family, and social history reviewed as documented in chart with pertinent positive medical, family, and social history detailed in HPI.    Physical Exam  Vitals:    11/05/24 1122   BP: 120/71   Pulse: 76   Resp: 17   Temp: 97.6 °F (36.4 °C)         General: Well-developed, well-nourished, in no acute distress  HEENT: Normocephalic, atraumatic, extraocular eye movements in tact  Neck: supple, trachea midline, no cervical or supraclavicular adenopathy  Respirations: Even and unlabored  Extremities: Moves all extremities equally, atraumatic, no clubbing, cyanosis, trace edema  Skin: warm and dry, no lesions  Psych: normal affect  Neuro: Alert and oriented x 3. Cranial nerves II-XII grossly intact    PVR: 58cc    UA: >1000 glucose, trace blood, trace LE    PSA  4/6/21-0.82  9/27/21-0.29  4/1/22: 0.17  10/5/22: 0.10  4/19/23: <0.01  10/20/23: 0.06  4/29/24: 0.05  10/23/24: 0.05    Assessment:   1. Urinary retention  POCT Urinalysis, Dipstick, Automated, W/O Scope    POCT Bladder Scan      2. Acute cystitis without hematuria  Urine culture      3. 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 for 7 days  Dispense: 14 capsule; Refill: 0  -     tamsulosin (FLOMAX) 0.4 mg Cap; Take 1 capsule (0.4 mg total) by mouth once daily.  Dispense: 30 capsule; Refill: 11        Follow up in about 6 weeks (around " 12/17/2024).

## 2024-11-08 LAB — BACTERIA UR CULT: ABNORMAL

## 2024-11-11 ENCOUNTER — PATIENT MESSAGE (OUTPATIENT)
Dept: UROLOGY | Facility: CLINIC | Age: 84
End: 2024-11-11
Payer: MEDICARE

## 2024-11-11 RX ORDER — CEFDINIR 300 MG/1
300 CAPSULE ORAL 2 TIMES DAILY
Qty: 14 CAPSULE | Refills: 0 | Status: SHIPPED | OUTPATIENT
Start: 2024-11-11 | End: 2024-11-18

## 2024-11-12 RX ORDER — TAMSULOSIN HYDROCHLORIDE 0.4 MG/1
0.8 CAPSULE ORAL DAILY
Qty: 60 CAPSULE | Refills: 11 | Status: SHIPPED | OUTPATIENT
Start: 2024-11-12 | End: 2025-11-12

## 2024-12-18 ENCOUNTER — OFFICE VISIT (OUTPATIENT)
Dept: UROLOGY | Facility: CLINIC | Age: 84
End: 2024-12-18
Payer: MEDICARE

## 2024-12-18 VITALS
HEIGHT: 68 IN | BODY MASS INDEX: 22.63 KG/M2 | DIASTOLIC BLOOD PRESSURE: 64 MMHG | SYSTOLIC BLOOD PRESSURE: 113 MMHG | HEART RATE: 78 BPM | RESPIRATION RATE: 18 BRPM

## 2024-12-18 DIAGNOSIS — R33.9 URINARY RETENTION: Primary | ICD-10-CM

## 2024-12-18 DIAGNOSIS — R81 GLUCOSURIA: ICD-10-CM

## 2024-12-18 DIAGNOSIS — C61 PROSTATE CANCER: ICD-10-CM

## 2024-12-18 DIAGNOSIS — N39.41 URGE INCONTINENCE: ICD-10-CM

## 2024-12-18 LAB
BILIRUB UR QL STRIP: NEGATIVE
GLUCOSE UR QL STRIP: POSITIVE
KETONES UR QL STRIP: NEGATIVE
LEUKOCYTE ESTERASE UR QL STRIP: NEGATIVE
PH, POC UA: 5
POC BLOOD, URINE: NEGATIVE
POC NITRATES, URINE: NEGATIVE
POC RESIDUAL URINE VOLUME: 33 ML (ref 0–100)
PROT UR QL STRIP: NEGATIVE
SP GR UR STRIP: 1.01 (ref 1–1.03)
UROBILINOGEN UR STRIP-ACNC: 0.2 (ref 0.3–2.2)

## 2024-12-18 PROCEDURE — 51798 US URINE CAPACITY MEASURE: CPT | Mod: S$GLB,,, | Performed by: UROLOGY

## 2024-12-18 PROCEDURE — 3288F FALL RISK ASSESSMENT DOCD: CPT | Mod: CPTII,S$GLB,, | Performed by: UROLOGY

## 2024-12-18 PROCEDURE — 1101F PT FALLS ASSESS-DOCD LE1/YR: CPT | Mod: CPTII,S$GLB,, | Performed by: UROLOGY

## 2024-12-18 PROCEDURE — 1159F MED LIST DOCD IN RCRD: CPT | Mod: CPTII,S$GLB,, | Performed by: UROLOGY

## 2024-12-18 PROCEDURE — 1126F AMNT PAIN NOTED NONE PRSNT: CPT | Mod: CPTII,S$GLB,, | Performed by: UROLOGY

## 2024-12-18 PROCEDURE — 3074F SYST BP LT 130 MM HG: CPT | Mod: CPTII,S$GLB,, | Performed by: UROLOGY

## 2024-12-18 PROCEDURE — 99999 PR PBB SHADOW E&M-EST. PATIENT-LVL III: CPT | Mod: PBBFAC,,, | Performed by: UROLOGY

## 2024-12-18 PROCEDURE — 81003 URINALYSIS AUTO W/O SCOPE: CPT | Mod: QW,S$GLB,, | Performed by: UROLOGY

## 2024-12-18 PROCEDURE — 99214 OFFICE O/P EST MOD 30 MIN: CPT | Mod: S$GLB,,, | Performed by: UROLOGY

## 2024-12-18 PROCEDURE — 3078F DIAST BP <80 MM HG: CPT | Mod: CPTII,S$GLB,, | Performed by: UROLOGY

## 2024-12-18 NOTE — PROGRESS NOTES
CC: follow up prostate cancer    History of Present Illness:   Brock Camara is a 84 y.o. male here for follow up.     12/18/24-taking flomax BID. UTI was treated with culture specific cefdinir. He is improved, but states that he is not quite normal. Still with urgency and UUI, happening every day. He is urinating a higher volume with each void than he was previously. Frequency Q2 hours. Nocturia x 1. No dysuria or gross hematuria. No dizziness.     11/5/24-Pt reports that he has been having dysuria and frequency/urgency. He also complains of UUI and malaise. Since catheter has been removed, he has tania voiding smaller amounts, only about 150cc at a time. He states that he is now off of his antihypertensives and would like to consider restarting flomax.     10/23/24-pt developed urinary retention while hospitalized at [facility] after a fall. Bess removed this am. He has urinated a few times today. He was started on flomax while in the hospital but Dr. Reyes took him off due to hypotension. He was also taken off of his losartan. Denies any issues urinating before falling. Thinks the difficulty was positional. No gross hematuria or dysuria.   5/1/24-not using cialis for ED. He has some urgency and frequency. Also reports a weak stream at times. No nocturia. No gross hematuria.   10/27/23-Sometimes he has urgency, but no UUI. No stranguria, dysuria or gross hematuria. Uses cialis very seldom. Works okay, but takes some time.   4/25/23-no urgency or gross hematuria. No dysuria. Cialis works well. Stream is fair. No stranguria. Diarrhea cleared up-- was related to protonix.   10/18/22- No urgency or gross hematuria. Occasional dysuria, but not bad. Cialis working well. Energy varies. No bone pain. Still with some diarrhea post-XRT.   4/5/22-Good stream. No gross hematuria. Still has diarrhea. No urinary urgency. No longer on flomax. Still with ED. viagra didn't work well. Reports diabetic control is fair.   10/5/21-On  flomax. Reports dysuria every time he urinates. No gross hematuria. Drinking cranberry juice, which helps. Stream is weak.   8/6/21: Pt reports retrograde ejaculation, ED and a growth on the side of his scrotum. He states that he ejaculated in June and had a severe pain in his Left groin. Restarted flomax, but wasn't able to ejaculate even before the flomax. After restarting the flomax, ejaculated, but no pain, not much semen comes out. Hasn't really ejaculated much since radiation. Denies stranguria. Slight dysuria, but not much. Has some initial dribbling of his stream, but then it strengthens. Had a colonoscopy and EUS. Had 5 polyps. Still has Fecal Urgency.   4/6/21: Brock Camara is a 81 y.o. male here for follow up. He has a h/o [place] 4+3 prostate adenocarcinoma, diagnosed 5/2020 with a PSA of 7.6. Completed XRT 8/2020 with Dr. Carney. No ADT. Urinating okay. Slight dysuria. No gross hematuria. Stopped taking flomax around September because he wasn't having any pain. Also has fecal urgency and FI. States that he hasn't been back to see his Gastroenterologist, but needs to, due to h/o 16 polyps on last colonoscopy.     Review of Systems   Constitutional:  Negative for chills and fever.   Respiratory:  Negative for shortness of breath.    Cardiovascular:  Negative for chest pain.   Gastrointestinal:  Negative for abdominal pain, diarrhea and rectal pain.   Genitourinary:  Negative for difficulty urinating and hematuria.   All other systems reviewed and are negative.      Current Outpatient Medications   Medication Sig Dispense Refill    JARDIANCE 25 mg tablet Take 25 mg by mouth once daily.      metFORMIN (GLUCOPHAGE) 1000 MG tablet 1,000 mg 2 (two) times a day.      simvastatin (ZOCOR) 40 MG tablet Take 40 mg by mouth once daily.      tamsulosin (FLOMAX) 0.4 mg Cap Take 2 capsules (0.8 mg total) by mouth once daily. 60 capsule 11    glimepiride (AMARYL) 2 MG tablet Take 2 mg by mouth before breakfast.    "   losartan (COZAAR) 100 MG tablet Take 100 mg by mouth once daily. (Patient not taking: Reported on 11/5/2024)      omeprazole (PRILOSEC) 40 MG capsule Take 40 mg by mouth once daily.       No current facility-administered medications for this visit.       Review of patient's allergies indicates:   Allergen Reactions    Adhesive      Pt states allergic to "Paper Tape" = rash       Past medical, family, and social history reviewed as documented in chart with pertinent positive medical, family, and social history detailed in HPI.    Physical Exam  Vitals:    12/18/24 1054   BP: 113/64   Pulse: 78   Resp: 18         General: Well-developed, well-nourished, in no acute distress  HEENT: Normocephalic, atraumatic, extraocular eye movements in tact  Neck: supple, trachea midline, no cervical or supraclavicular adenopathy  Respirations: Even and unlabored  Extremities: Moves all extremities equally, atraumatic, no clubbing, cyanosis, trace edema  Skin: warm and dry, no lesions  Psych: normal affect  Neuro: Alert and oriented x 3. Cranial nerves II-XII grossly intact    PVR: 33cc    UA: >1000 glucose, negative otherwise    PSA  4/6/21-0.82  9/27/21-0.29  4/1/22: 0.17  10/5/22: 0.10  4/19/23: <0.01  10/20/23: 0.06  4/29/24: 0.05  10/23/24: 0.05    Assessment:   1. Urinary retention  POCT Urinalysis, Dipstick, Automated, W/O Scope    POCT Bladder Scan      2. Prostate cancer  PROSTATE SPECIFIC ANTIGEN, DIAGNOSTIC      3. Urge incontinence        4. Glucosuria              Plan:  Urinary retention  -     POCT Urinalysis, Dipstick, Automated, W/O Scope  -     POCT Bladder Scan    Prostate cancer  -     PROSTATE SPECIFIC ANTIGEN, DIAGNOSTIC; Future; Expected date: 06/18/2025    Urge incontinence    Glucosuria      Discussed with pt that Jardiance may be contributing to Urgency due to glucosuria. Pt to discuss possible change in medications with his PCP. Will hold off on adding any additional medications for urge symptoms until " that option has been explored.   Continue flomax.   Follow up in about 6 months (around 6/18/2025).

## 2024-12-30 ENCOUNTER — OFFICE VISIT (OUTPATIENT)
Dept: PRIMARY CARE CLINIC | Facility: CLINIC | Age: 84
End: 2024-12-30
Payer: MEDICARE

## 2024-12-30 ENCOUNTER — LAB VISIT (OUTPATIENT)
Dept: LAB | Facility: HOSPITAL | Age: 84
End: 2024-12-30
Attending: FAMILY MEDICINE
Payer: MEDICARE

## 2024-12-30 VITALS
HEART RATE: 69 BPM | HEIGHT: 68 IN | WEIGHT: 158.06 LBS | DIASTOLIC BLOOD PRESSURE: 60 MMHG | SYSTOLIC BLOOD PRESSURE: 120 MMHG | BODY MASS INDEX: 23.95 KG/M2

## 2024-12-30 DIAGNOSIS — E87.6 HYPOKALEMIA: ICD-10-CM

## 2024-12-30 DIAGNOSIS — E11.69 TYPE 2 DIABETES MELLITUS WITH OTHER SPECIFIED COMPLICATION, WITHOUT LONG-TERM CURRENT USE OF INSULIN: ICD-10-CM

## 2024-12-30 DIAGNOSIS — R53.1 GENERALIZED WEAKNESS: ICD-10-CM

## 2024-12-30 DIAGNOSIS — E78.5 HYPERLIPIDEMIA, UNSPECIFIED HYPERLIPIDEMIA TYPE: ICD-10-CM

## 2024-12-30 DIAGNOSIS — Z76.89 ENCOUNTER TO ESTABLISH CARE: Primary | ICD-10-CM

## 2024-12-30 DIAGNOSIS — I10 PRIMARY HYPERTENSION: ICD-10-CM

## 2024-12-30 DIAGNOSIS — J31.0 CHRONIC RHINITIS: ICD-10-CM

## 2024-12-30 PROBLEM — E11.9 TYPE 2 DIABETES MELLITUS WITHOUT COMPLICATION, WITHOUT LONG-TERM CURRENT USE OF INSULIN: Status: RESOLVED | Noted: 2022-10-19 | Resolved: 2024-12-30

## 2024-12-30 PROBLEM — E11.9 TYPE 2 DIABETES MELLITUS, WITHOUT LONG-TERM CURRENT USE OF INSULIN: Status: ACTIVE | Noted: 2024-12-30

## 2024-12-30 LAB
ALBUMIN SERPL BCP-MCNC: 3.9 G/DL (ref 3.5–5.2)
ALBUMIN/CREAT UR: NORMAL UG/MG (ref 0–30)
ALP SERPL-CCNC: 61 U/L (ref 40–150)
ALT SERPL W/O P-5'-P-CCNC: 34 U/L (ref 10–44)
ANION GAP SERPL CALC-SCNC: 10 MMOL/L (ref 8–16)
AST SERPL-CCNC: 25 U/L (ref 10–40)
BASOPHILS # BLD AUTO: 0.05 K/UL (ref 0–0.2)
BASOPHILS NFR BLD: 0.6 % (ref 0–1.9)
BILIRUB SERPL-MCNC: 0.6 MG/DL (ref 0.1–1)
BUN SERPL-MCNC: 17 MG/DL (ref 8–23)
CALCIUM SERPL-MCNC: 9.7 MG/DL (ref 8.7–10.5)
CHLORIDE SERPL-SCNC: 108 MMOL/L (ref 95–110)
CHOLEST SERPL-MCNC: 151 MG/DL (ref 120–199)
CHOLEST/HDLC SERPL: 2.4 {RATIO} (ref 2–5)
CO2 SERPL-SCNC: 21 MMOL/L (ref 23–29)
CREAT SERPL-MCNC: 0.8 MG/DL (ref 0.5–1.4)
CREAT UR-MCNC: 45 MG/DL (ref 23–375)
DIFFERENTIAL METHOD BLD: ABNORMAL
EOSINOPHIL # BLD AUTO: 0.4 K/UL (ref 0–0.5)
EOSINOPHIL NFR BLD: 5.5 % (ref 0–8)
ERYTHROCYTE [DISTWIDTH] IN BLOOD BY AUTOMATED COUNT: 13.9 % (ref 11.5–14.5)
EST. GFR  (NO RACE VARIABLE): >60 ML/MIN/1.73 M^2
ESTIMATED AVG GLUCOSE: 174 MG/DL (ref 68–131)
GLUCOSE SERPL-MCNC: 168 MG/DL (ref 70–110)
HBA1C MFR BLD: 7.7 % (ref 4–5.6)
HCT VFR BLD AUTO: 39.6 % (ref 40–54)
HDLC SERPL-MCNC: 64 MG/DL (ref 40–75)
HDLC SERPL: 42.4 % (ref 20–50)
HGB BLD-MCNC: 13.2 G/DL (ref 14–18)
IMM GRANULOCYTES # BLD AUTO: 0.01 K/UL (ref 0–0.04)
IMM GRANULOCYTES NFR BLD AUTO: 0.1 % (ref 0–0.5)
LDLC SERPL CALC-MCNC: 67.8 MG/DL (ref 63–159)
LYMPHOCYTES # BLD AUTO: 1.3 K/UL (ref 1–4.8)
LYMPHOCYTES NFR BLD: 16.6 % (ref 18–48)
MCH RBC QN AUTO: 30.8 PG (ref 27–31)
MCHC RBC AUTO-ENTMCNC: 33.3 G/DL (ref 32–36)
MCV RBC AUTO: 92 FL (ref 82–98)
MICROALBUMIN UR DL<=1MG/L-MCNC: <5 UG/ML
MONOCYTES # BLD AUTO: 0.6 K/UL (ref 0.3–1)
MONOCYTES NFR BLD: 7.4 % (ref 4–15)
NEUTROPHILS # BLD AUTO: 5.4 K/UL (ref 1.8–7.7)
NEUTROPHILS NFR BLD: 69.8 % (ref 38–73)
NONHDLC SERPL-MCNC: 87 MG/DL
NRBC BLD-RTO: 0 /100 WBC
PLATELET # BLD AUTO: 260 K/UL (ref 150–450)
PMV BLD AUTO: 11.2 FL (ref 9.2–12.9)
POTASSIUM SERPL-SCNC: 4.6 MMOL/L (ref 3.5–5.1)
PROT SERPL-MCNC: 6.7 G/DL (ref 6–8.4)
RBC # BLD AUTO: 4.29 M/UL (ref 4.6–6.2)
SODIUM SERPL-SCNC: 139 MMOL/L (ref 136–145)
TRIGL SERPL-MCNC: 96 MG/DL (ref 30–150)
TSH SERPL DL<=0.005 MIU/L-ACNC: 1.05 UIU/ML (ref 0.4–4)
WBC # BLD AUTO: 7.75 K/UL (ref 3.9–12.7)

## 2024-12-30 PROCEDURE — 36415 COLL VENOUS BLD VENIPUNCTURE: CPT | Mod: PN | Performed by: FAMILY MEDICINE

## 2024-12-30 PROCEDURE — 1100F PTFALLS ASSESS-DOCD GE2>/YR: CPT | Mod: CPTII,S$GLB,, | Performed by: FAMILY MEDICINE

## 2024-12-30 PROCEDURE — 80061 LIPID PANEL: CPT | Performed by: FAMILY MEDICINE

## 2024-12-30 PROCEDURE — 99215 OFFICE O/P EST HI 40 MIN: CPT | Mod: S$GLB,,, | Performed by: FAMILY MEDICINE

## 2024-12-30 PROCEDURE — 1126F AMNT PAIN NOTED NONE PRSNT: CPT | Mod: CPTII,S$GLB,, | Performed by: FAMILY MEDICINE

## 2024-12-30 PROCEDURE — 99999 PR PBB SHADOW E&M-EST. PATIENT-LVL IV: CPT | Mod: PBBFAC,,, | Performed by: FAMILY MEDICINE

## 2024-12-30 PROCEDURE — 84443 ASSAY THYROID STIM HORMONE: CPT | Performed by: FAMILY MEDICINE

## 2024-12-30 PROCEDURE — 80053 COMPREHEN METABOLIC PANEL: CPT | Performed by: FAMILY MEDICINE

## 2024-12-30 PROCEDURE — 82043 UR ALBUMIN QUANTITATIVE: CPT | Performed by: FAMILY MEDICINE

## 2024-12-30 PROCEDURE — 3074F SYST BP LT 130 MM HG: CPT | Mod: CPTII,S$GLB,, | Performed by: FAMILY MEDICINE

## 2024-12-30 PROCEDURE — 85025 COMPLETE CBC W/AUTO DIFF WBC: CPT | Performed by: FAMILY MEDICINE

## 2024-12-30 PROCEDURE — G2211 COMPLEX E/M VISIT ADD ON: HCPCS | Mod: S$GLB,,, | Performed by: FAMILY MEDICINE

## 2024-12-30 PROCEDURE — 3288F FALL RISK ASSESSMENT DOCD: CPT | Mod: CPTII,S$GLB,, | Performed by: FAMILY MEDICINE

## 2024-12-30 PROCEDURE — 1159F MED LIST DOCD IN RCRD: CPT | Mod: CPTII,S$GLB,, | Performed by: FAMILY MEDICINE

## 2024-12-30 PROCEDURE — 3078F DIAST BP <80 MM HG: CPT | Mod: CPTII,S$GLB,, | Performed by: FAMILY MEDICINE

## 2024-12-30 PROCEDURE — 83036 HEMOGLOBIN GLYCOSYLATED A1C: CPT | Performed by: FAMILY MEDICINE

## 2024-12-30 RX ORDER — POTASSIUM CHLORIDE 20 MEQ/1
40 TABLET, EXTENDED RELEASE ORAL EVERY MORNING
Qty: 180 TABLET | Refills: 3 | Status: SHIPPED | OUTPATIENT
Start: 2024-12-30

## 2024-12-30 RX ORDER — EMPAGLIFLOZIN 25 MG/1
25 TABLET, FILM COATED ORAL DAILY
Qty: 90 TABLET | Refills: 3 | Status: SHIPPED | OUTPATIENT
Start: 2024-12-30

## 2024-12-30 RX ORDER — LORATADINE 10 MG/1
10 TABLET ORAL DAILY
Qty: 90 TABLET | Refills: 3 | Status: SHIPPED | OUTPATIENT
Start: 2024-12-30 | End: 2025-12-30

## 2024-12-30 RX ORDER — METFORMIN HYDROCHLORIDE 1000 MG/1
1000 TABLET ORAL 2 TIMES DAILY WITH MEALS
Qty: 180 TABLET | Refills: 3 | Status: SHIPPED | OUTPATIENT
Start: 2024-12-30

## 2024-12-30 RX ORDER — SIMVASTATIN 40 MG/1
40 TABLET, FILM COATED ORAL DAILY
Qty: 90 TABLET | Refills: 3 | Status: SHIPPED | OUTPATIENT
Start: 2024-12-30

## 2024-12-30 RX ORDER — POTASSIUM CHLORIDE 20 MEQ/1
40 TABLET, EXTENDED RELEASE ORAL EVERY MORNING
COMMUNITY
End: 2024-12-30 | Stop reason: SDUPTHER

## 2024-12-30 NOTE — PATIENT INSTRUCTIONS
Physically, everything looks pretty good.      Let us get some screening blood work done today to check things on the inside.  Results will be posted to [facility] as soon as they are available.      When we get the results of the blood work, we may decide to adjust some medications.  We will contact you at that time, if needed.  In the meantime, I did send refills of your medicines to the pharmacy so the prescriptions will have my name on it.  Continue taking them, as directed.      I think doing some physical therapy is a great idea!  Referral sent to [facility].  Feel free to call them to schedule an appointment.    Would recommend switching from the single leg cane to a claw foot cane.  It provides much more stable support when walking around.    Continue to eat a healthy diet.  Be careful with portion sizes.  Includes lots of fresh fruits, vegetables, whole grains, lean proteins.  See info below.    Keep hydrated.  Be sure to drink at least 8-10, 8 oz, glasses of water every day.    Stay active.  Try to do some sort of physical activity every day.  Nothing outrageous, just try walking for 10-15 minutes each day.

## 2024-12-30 NOTE — PROGRESS NOTES
"    [facility]       [address] [place] [address]      Phone: [phone]      Fax: [phone]    Adam Kong MD                Office Visit  12/30/2024        Subjective      HPI:  Brock Camara is a 84 y.o. male presents today in clinic for "Establish Care  ."     84-year-old gentleman presents today to establish care, discuss a couple of issues.      His son, Brock, and daughter-in-law, Halle, are present with him.  They provide most of the history.      Patient states he feels fine today.  No acute complaints.  No chest pain, shortness on breath.  No fever, chills, body aches.  No coughing, sneezing, URI type symptoms.  Appetite normal.  Bowel movements normal.      Has issues with urinary frequency.  Had a catheter in for awhile.  Follows with Urology.  They started him on Flomax, which helps.    Has diabetes.  Unsure of last A1c.  Sugar has been up and down.  Was taking Glucophage, but that was discontinued because his levels were dropping.  Currently takes metformin 1000 mg twice a day, Jardiance 25 mg daily.    Has HTN.  Blood pressure tends to fluctuate, but generally in a good range.  Not currently on medication.      Has HLD.  Takes simvastatin 40 mg daily.  No issues with this medication.    Has some occasional issues with allergies.  Uses Claritin.  They think insurance may pay for it.    Had a fall last September.  Was down for about 6-1/2 hours.  When he went to the hospital, they discussed sending him to inpatient rehab, but they decided to go home instead.  Has been getting some physical therapy, but that finished.  When he 1st left the hospital, he could barely stand up.  Physical therapy helped.  Now, he walks with a cane.  Still has some weakness, gets unsteady.  They would like to do some outpatient physical therapy.  In addition to the cane, he does have a Rollator at home that he uses occasionally.      PMH: Dm.  HTN.  HLD.  " Chronic rhinitis.  Prostate cancer/radiation.  PSH: AC separation.  Tumor of buttock.  Allergy:  Adhesive tape.    Social: Retired.  Previously worked as a .  .  Lives with son, daughter-in-law.    T: Denies current use.  Quit in 1983.    A: Rarely   D: Denies    Exercise: Tries to walk when he can.  Was doing physical therapy at home.      Urology: Dr. Masters        The following were updated and reviewed by myself in the chart: medications, past medical history, past surgical history, family history, social history, and allergies.     Medications:  Current Outpatient Medications on File Prior to Visit   Medication Sig Dispense Refill    tamsulosin (FLOMAX) 0.4 mg Cap Take 2 capsules (0.8 mg total) by mouth once daily. 60 capsule 11    [DISCONTINUED] JARDIANCE 25 mg tablet Take 25 mg by mouth once daily.      [DISCONTINUED] metFORMIN (GLUCOPHAGE) 1000 MG tablet 1,000 mg 2 (two) times a day.      [DISCONTINUED] potassium chloride SA (K-DUR,KLOR-CON) 20 MEQ tablet Take 40 mEq by mouth every morning.      [DISCONTINUED] simvastatin (ZOCOR) 40 MG tablet Take 40 mg by mouth once daily.       No current facility-administered medications on file prior to visit.        PMHx:  Past Medical History:   Diagnosis Date    Diabetes mellitus, type 2     Hyperlipidemia       Patient Active Problem List    Diagnosis Date Noted    Type 2 diabetes mellitus, without long-term current use of insulin 12/30/2024    Erectile dysfunction following radiation therapy 04/25/2023    Prostate cancer 10/05/2021    Dysuria 10/05/2021        PSHx:  Past Surgical History:   Procedure Laterality Date    SHOULDER SURGERY          FHx:  No family history on file.     Social:  Social History     Socioeconomic History    Marital status: Single   Tobacco Use    Smoking status: Former    Smokeless tobacco: Never   Substance and Sexual Activity    Alcohol use: Not Currently    Drug use: Never    Sexual activity: Yes  "    Partners: Female     Social Drivers of Health     Financial Resource Strain: Low Risk  (10/22/2024)    Overall Financial Resource Strain (CARDIA)     Difficulty of Paying Living Expenses: Not hard at all   Food Insecurity: No Food Insecurity (10/22/2024)    Hunger Vital Sign     Worried About Running Out of Food in the Last Year: Never true     Ran Out of Food in the Last Year: Never true   Transportation Needs: No Transportation Needs (10/5/2024)    [facility]    [facility]     Lack of Transportation (Medical): No     Lack of Transportation (Non-Medical): No   Physical Activity: Insufficiently Active (10/22/2024)    Exercise Vital Sign     Days of Exercise per Week: 5 days     Minutes of Exercise per Session: 20 min   Stress: No Stress Concern Present (10/22/2024)    [employer] [employer] - Occupational Stress Questionnaire     Feeling of Stress : Only a little   Housing Stability: Unknown (10/22/2024)    Housing Stability Vital Sign     Unable to Pay for Housing in the Last Year: No        Allergies:  Review of patient's allergies indicates:   Allergen Reactions    Adhesive      Pt states allergic to "Paper Tape" = rash        ROS:  Review of Systems   Constitutional:  Positive for activity change. Negative for appetite change, chills and fever.   HENT:  Negative for congestion, postnasal drip, rhinorrhea, sore throat and trouble swallowing.    Respiratory:  Negative for cough and shortness of breath.    Cardiovascular:  Negative for chest pain and palpitations.   Gastrointestinal:  Negative for abdominal pain, constipation, diarrhea, nausea and vomiting.   Genitourinary:  Negative for difficulty urinating.   Musculoskeletal:  Negative for arthralgias and myalgias.   Skin:  Negative for color change and rash.   Neurological:  Positive for weakness. Negative for headaches.   All other " "systems reviewed and are negative.         Objective      /60   Pulse 69   Ht 5' 8" (1.727 m)   Wt 71.7 kg (158 lb 1.1 oz)   BMI 24.03 kg/m²   Ht Readings from Last 3 Encounters:   12/30/24 5' 8" (1.727 m)   12/18/24 5' 8" (1.727 m)   11/05/24 5' 8" (1.727 m)     Wt Readings from Last 3 Encounters:   12/30/24 71.7 kg (158 lb 1.1 oz)   11/05/24 67.5 kg (148 lb 13 oz)   10/23/24 73.6 kg (162 lb 4.1 oz)       PHYSICAL EXAM:  Physical Exam  Vitals and nursing note reviewed.   Constitutional:       General: He is not in acute distress.     Appearance: Normal appearance.   HENT:      Head: Normocephalic and atraumatic.      Right Ear: Tympanic membrane, ear canal and external ear normal.      Left Ear: Tympanic membrane, ear canal and external ear normal.      Nose: Nose normal. No congestion or rhinorrhea.      Mouth/Throat:      Mouth: Mucous membranes are moist.      Pharynx: Oropharynx is clear. No oropharyngeal exudate or posterior oropharyngeal erythema.   Eyes:      Extraocular Movements: Extraocular movements intact.      Conjunctiva/sclera: Conjunctivae normal.      Pupils: Pupils are equal, round, and reactive to light.   Cardiovascular:      Rate and Rhythm: Normal rate and regular rhythm.   Pulmonary:      Effort: Pulmonary effort is normal.      Breath sounds: No wheezing, rhonchi or rales.   Musculoskeletal:         General: Normal range of motion.      Cervical back: Normal range of motion.   Lymphadenopathy:      Cervical: No cervical adenopathy.   Skin:     General: Skin is warm and dry.   Neurological:      General: No focal deficit present.      Mental Status: He is alert.              LABS / IMAGING:  Recent Results (from the past 26 weeks)   TROPONIN    Collection Time: 10/05/24  4:07 AM   Result Value Ref Range    Troponin I 0.02 <0.01 - 0.03 ng/mL   CK    Collection Time: 10/05/24  4:07 AM   Result Value Ref Range    CPK 1,011 (H) 30 - 200 U/L   LACTIC ACID, PLASMA    Collection Time: " 10/05/24  4:07 AM   Result Value Ref Range    Lactic Acid 1.5 0.5 - 2.2 mmol/L   Birgit 2 Flu + SARS Antigen NAZANIN -Use Dry Swab    Collection Time: 10/05/24  4:41 AM   Result Value Ref Range    Inflenza A Ag Negative Negative    Influenza B Ag Negative Negative    COVID-19 Ag Negative Negative   CK    Collection Time: 10/05/24 10:53 AM   Result Value Ref Range     (H) 30 - 200 U/L   CREATININE, SERUM    Collection Time: 10/06/24 10:16 AM   Result Value Ref Range    Creatinine 0.93 0.73 - 1.18 mg/dL    eGFR African American 81 mL/min/1.73mSq   BASIC METABOLIC PANEL    Collection Time: 10/07/24  5:37 AM   Result Value Ref Range    Creatinine 0.82 0.73 - 1.18 mg/dL    Blood Urea Nitrogen 10 5 - 25 mg/dL    Sodium 133 (L) 136 - 145 mmol/L    Potassium 3.3 (L) 3.5 - 5.1 mmol/L    Chloride 104 100 - 109 mmol/L    Carbon Dioxide 21 (L) 22 - 33 mmol/L    Glucose Screen 149 (H) 70 - 100 mg/dL    Calcium 8.3 (L) 8.8 - 10.6 mg/dL    Anion Gap 8 8 - 16 mmol/L    eGFR African American 87 mL/min/1.73mSq   POCT GLUCOSE    Collection Time: 10/13/24  6:33 PM   Result Value Ref Range    POC Glucose 81 70 - 100 mg/dL   CK    Collection Time: 10/13/24  6:39 PM   Result Value Ref Range    CPK 52 30 - 200 U/L   TROPONIN    Collection Time: 10/13/24  6:39 PM   Result Value Ref Range    Troponin I <0.01 <0.01 - 0.03 ng/mL   LACTIC ACID, PLASMA    Collection Time: 10/13/24  6:39 PM   Result Value Ref Range    Lactic Acid 2.0 0.5 - 2.2 mmol/L   Birgit 2 Flu + SARS Antigen NAZANIN -Use Dry Swab    Collection Time: 10/13/24  6:46 PM   Result Value Ref Range    Inflenza A Ag Negative Negative    Influenza B Ag Negative Negative    COVID-19 Ag Negative Negative   POCT Bladder Scan    Collection Time: 10/23/24  2:16 PM   Result Value Ref Range    POC Residual Urine Volume 389 (A) 0 - 100 mL    POC Residual Urine Volume 0 0 - 100 mL   POCT Urinalysis, Dipstick, Automated, W/O Scope    Collection Time: 10/23/24  2:20 PM   Result Value Ref Range     POC Blood, Urine Positive (A) Negative    POC Bilirubin, Urine Negative Negative    POC Urobilinogen, Urine 0.2 0.3 - 2.2    POC Ketones, Urine Negative Negative    POC Protein, Urine Negative Negative    POC Nitrates, Urine Negative Negative    POC Glucose, Urine Positive (A) Negative    pH, UA 5.5     POC Specific Gravity, Urine <=1.005 1.003 - 1.029    POC Leukocytes, Urine Positive (A) Negative   Urine culture    Collection Time: 10/23/24  2:59 PM    Specimen: Urine, Clean Catch   Result Value Ref Range    Urine Culture, Routine       Multiple organisms isolated. None in predominance.  Repeat if    Urine Culture, Routine clinically necessary.    Prostate Specific Antigen, Diagnostic    Collection Time: 10/23/24  3:08 PM   Result Value Ref Range    PSA Diagnostic 0.05 0.00 - 4.00 ng/mL   POCT Bladder Scan    Collection Time: 11/05/24 11:29 AM   Result Value Ref Range    POC Residual Urine Volume 58 0 - 100 mL   Urine culture    Collection Time: 11/05/24 11:40 AM    Specimen: Urine, Clean Catch   Result Value Ref Range    Urine Culture, Routine CITROBACTER KOSERI  50,000 - 99,999 cfu/ml   (A)        Susceptibility    Citrobacter koseri - CULTURE, URINE     Amp/Sulbactam <=8/4 Sensitive mcg/mL     Ceftriaxone <=1 Sensitive mcg/mL     Cefazolin <=2 Sensitive mcg/mL     Ciprofloxacin <=0.25 Sensitive mcg/mL     Cefepime <=2 Sensitive mcg/mL     Ertapenem <=0.5 Sensitive mcg/mL     Nitrofurantoin 64 Intermediate mcg/mL     Gentamicin <=2 Sensitive mcg/mL     Levofloxacin <=0.5 Sensitive mcg/mL     Meropenem <=1 Sensitive mcg/mL     Piperacillin/Tazo <=8 Sensitive mcg/mL     Trimeth/Sulfa <=2/38 Sensitive mcg/mL     Tobramycin 4 Intermediate mcg/mL   POCT Urinalysis, Dipstick, Automated, W/O Scope    Collection Time: 11/05/24  3:43 PM   Result Value Ref Range    POC Blood, Urine Positive (A) Negative    POC Bilirubin, Urine Negative Negative    POC Urobilinogen, Urine 0.2 (A) 0.3 - 2.2    POC Ketones, Urine Negative  Negative    POC Protein, Urine Negative Negative    POC Nitrates, Urine Negative Negative    POC Glucose, Urine Positive (A) Negative    pH, UA 5.0     POC Specific Gravity, Urine 1.010 1.003 - 1.029    POC Leukocytes, Urine Positive (A) Negative   POCT Urinalysis, Dipstick, Automated, W/O Scope    Collection Time: 12/18/24 11:01 AM   Result Value Ref Range    POC Blood, Urine Negative Negative    POC Bilirubin, Urine Negative Negative    POC Urobilinogen, Urine 0.2 (A) 0.3 - 2.2    POC Ketones, Urine Negative Negative    POC Protein, Urine Negative Negative    POC Nitrates, Urine Negative Negative    POC Glucose, Urine Positive (A) Negative    pH, UA 5.0     POC Specific Gravity, Urine 1.015 1.003 - 1.029    POC Leukocytes, Urine Negative Negative   POCT Bladder Scan    Collection Time: 12/18/24 11:02 AM   Result Value Ref Range    POC Residual Urine Volume 33 0 - 100 mL         Assessment    1. Encounter to establish care    2. Type 2 diabetes mellitus with other specified complication, without long-term current use of insulin    3. Primary hypertension    4. Hyperlipidemia, unspecified hyperlipidemia type    5. Hypokalemia    6. Generalized weakness    7. Chronic rhinitis          Plan    Brock was seen today for establish care.    Diagnoses and all orders for this visit:    Encounter to establish care    Type 2 diabetes mellitus with other specified complication, without long-term current use of insulin  -     JARDIANCE 25 mg tablet; Take 1 tablet (25 mg total) by mouth once daily.  -     metFORMIN (GLUCOPHAGE) 1000 MG tablet; Take 1 tablet (1,000 mg total) by mouth 2 (two) times daily with meals.  -     Hemoglobin A1C; Future  -     Microalbumin/Creatinine Ratio, Urine; Future    Primary hypertension  -     CBC Auto Differential; Future  -     Comprehensive Metabolic Panel; Future  -     TSH; Future  -     Lipid Panel; Future    Hyperlipidemia, unspecified hyperlipidemia type  -     simvastatin (ZOCOR) 40 MG  tablet; Take 1 tablet (40 mg total) by mouth once daily.  -     Lipid Panel; Future    Hypokalemia  -     potassium chloride SA (K-DUR,KLOR-CON) 20 MEQ tablet; Take 2 tablets (40 mEq total) by mouth every morning.  -     Comprehensive Metabolic Panel; Future    Generalized weakness  -     Ambulatory Referral/Consult to Physical Therapy; Future    Chronic rhinitis  -     loratadine (CLARITIN) 10 mg tablet; Take 1 tablet (10 mg total) by mouth once daily.      Physically, he looks pretty good.      A bit unsteady climbing up on exam table.  We would like to see him do some physical therapy again.  They would like to use [facility].  Referral placed.      Recommended that instead of the single foot cane, he switch to claw foot cane for more stability.  We would like him to use Rollator more frequently.  We would like PT to work with him on this.    Screening labs, as above.    Med refills sent to the pharmacy.  We can make adjustments, if needed, once we get labs back.    FOLLOW-UP:  Follow up in about 3 months (around 3/30/2025) for recheck.    I spent a total of 40 minutes face to face and non-face to face on the date of this visit.This includes time preparing to see the patient (eg, review of tests, notes), obtaining and/or reviewing additional history from an independent historian and/or outside medical records, documenting clinical information in the electronic health record, independently interpreting results and/or communicating results to the patient/family/caregiver, or care coordinator.  Visit today included increased complexity associated with the care of the episodic problem addressed and managing the longitudinal care of the patient due to the serious and/or complex managed problem(s).    Signed by:  Adam Kong MD

## 2025-03-06 ENCOUNTER — NURSE TRIAGE (OUTPATIENT)
Dept: ADMINISTRATIVE | Facility: CLINIC | Age: 85
End: 2025-03-06
Payer: MEDICARE

## 2025-03-06 ENCOUNTER — OFFICE VISIT (OUTPATIENT)
Dept: INTERNAL MEDICINE | Facility: CLINIC | Age: 85
End: 2025-03-06
Payer: MEDICARE

## 2025-03-06 VITALS
WEIGHT: 153.69 LBS | BODY MASS INDEX: 23.29 KG/M2 | HEART RATE: 67 BPM | HEIGHT: 68 IN | DIASTOLIC BLOOD PRESSURE: 60 MMHG | OXYGEN SATURATION: 97 % | TEMPERATURE: 97 F | SYSTOLIC BLOOD PRESSURE: 112 MMHG

## 2025-03-06 DIAGNOSIS — B35.3 TINEA PEDIS OF RIGHT FOOT: ICD-10-CM

## 2025-03-06 DIAGNOSIS — E11.628 TYPE 2 DIABETES MELLITUS WITH OTHER SKIN COMPLICATION, WITHOUT LONG-TERM CURRENT USE OF INSULIN: ICD-10-CM

## 2025-03-06 DIAGNOSIS — L60.0 INGROWN TOENAIL: Primary | ICD-10-CM

## 2025-03-06 PROCEDURE — 3288F FALL RISK ASSESSMENT DOCD: CPT | Mod: CPTII,S$GLB,, | Performed by: INTERNAL MEDICINE

## 2025-03-06 PROCEDURE — 1101F PT FALLS ASSESS-DOCD LE1/YR: CPT | Mod: CPTII,S$GLB,, | Performed by: INTERNAL MEDICINE

## 2025-03-06 PROCEDURE — 99213 OFFICE O/P EST LOW 20 MIN: CPT | Mod: S$GLB,,, | Performed by: INTERNAL MEDICINE

## 2025-03-06 PROCEDURE — 1159F MED LIST DOCD IN RCRD: CPT | Mod: CPTII,S$GLB,, | Performed by: INTERNAL MEDICINE

## 2025-03-06 PROCEDURE — 1126F AMNT PAIN NOTED NONE PRSNT: CPT | Mod: CPTII,S$GLB,, | Performed by: INTERNAL MEDICINE

## 2025-03-06 PROCEDURE — 3074F SYST BP LT 130 MM HG: CPT | Mod: CPTII,S$GLB,, | Performed by: INTERNAL MEDICINE

## 2025-03-06 PROCEDURE — 99999 PR PBB SHADOW E&M-EST. PATIENT-LVL IV: CPT | Mod: PBBFAC,,, | Performed by: INTERNAL MEDICINE

## 2025-03-06 PROCEDURE — 3078F DIAST BP <80 MM HG: CPT | Mod: CPTII,S$GLB,, | Performed by: INTERNAL MEDICINE

## 2025-03-06 PROCEDURE — 1160F RVW MEDS BY RX/DR IN RCRD: CPT | Mod: CPTII,S$GLB,, | Performed by: INTERNAL MEDICINE

## 2025-03-06 RX ORDER — SULFAMETHOXAZOLE AND TRIMETHOPRIM 800; 160 MG/1; MG/1
1 TABLET ORAL 2 TIMES DAILY
Qty: 20 TABLET | Refills: 0 | Status: SHIPPED | OUTPATIENT
Start: 2025-03-06 | End: 2025-03-16

## 2025-03-06 RX ORDER — KETOCONAZOLE 20 MG/G
CREAM TOPICAL 2 TIMES DAILY
Qty: 15 G | Refills: 0 | Status: SHIPPED | OUTPATIENT
Start: 2025-03-06

## 2025-03-06 NOTE — PROGRESS NOTES
"Subjective:       Patient ID: Brock Camara is a 85 y.o. male.    Chief Complaint: Nail Problem (Right great toe and right pinky toe )      HPI:  History of Present Illness    Patient presents today for infected toe with drainage    He reports toe drainage on socks for weeks following a self-performed toenail cutting. He noted increased redness last night and has been treating with Epsom salt soaks.    He has a history of diabetes mellitus.    He is scheduled for root canal next week and denies current tooth pain.         Review of Systems    Objective:   /60 (Patient Position: Sitting)   Pulse 67   Temp 96.8 °F (36 °C) (Tympanic)   Ht 5' 8" (1.727 m)   Wt 69.7 kg (153 lb 10.6 oz)   SpO2 97%   BMI 23.36 kg/m²      Physical Exam  Constitutional:       General: He is not in acute distress.     Appearance: Normal appearance. He is well-developed. He is not ill-appearing.   HENT:      Head: Normocephalic and atraumatic.   Eyes:      Extraocular Movements: Extraocular movements intact.   Pulmonary:      Effort: Pulmonary effort is normal. No respiratory distress.   Musculoskeletal:      Cervical back: Normal range of motion.   Skin:     Coloration: Skin is not jaundiced or pale.      Findings: No rash.      Comments: Examination of the right foot reveals the right great toe nail to been cut deeply short.  There is exposed nail bed.  There is some purulent exudate on the surface of the nail bed.  There is mild redness to the toe the dorsum of the foot just proximal to the MCP.  Mild warmth.  Good capillary refill.    Examination of the lateral aspect of the foot reveals some drainage and skin maceration between the 4th and 5th toes and the 3rd and 4th toes.  There appears to be some fungal remnants in the interdigital spaces.   Neurological:      General: No focal deficit present.      Mental Status: He is alert and oriented to person, place, and time.      Cranial Nerves: No cranial nerve deficit. "   Psychiatric:         Behavior: Behavior normal.         Thought Content: Thought content normal.             Assessment:       1. Ingrown toenail    2. Tinea pedis of right foot    3. Type 2 diabetes mellitus with other skin complication, without long-term current use of insulin        Plan:   No problem-specific Assessment & Plan notes found for this encounter.    Brock was seen today for nail problem.    Diagnoses and all orders for this visit:    Ingrown toenail  -     sulfamethoxazole-trimethoprim 800-160mg (BACTRIM DS) 800-160 mg Tab; Take 1 tablet by mouth 2 (two) times daily. for 10 days  -     Ambulatory referral/consult to Podiatry; Future    Tinea pedis of right foot  -     ketoconazole (NIZORAL) 2 % cream; Apply topically 2 (two) times daily. To affected area on foot and toes  -     Ambulatory referral/consult to Podiatry; Future    Type 2 diabetes mellitus with other skin complication, without long-term current use of insulin  -     Ambulatory referral/consult to Podiatry; Future      Assessment & Plan    TYPE 2 DIABETES MELLITUS WITH OTHER SPECIFIED COMPLICATION, WITHOUT LONG-TERM CURRENT USE OF INSULIN:  - Evaluated the patient's toe, noting redness and drainage, indicating a possible diabetic foot complication.  - Diagnosed a fungal infection between the toes, another potential diabetic foot complication.  - Prescribed oral antibiotics twice daily for the toe infection.  - Prescribed antifungal cream for the fungal infection between toes.  - Recommend foot soaks with Epsom salts, hydrogen peroxide, and iodine in warm water for 15-20 minutes, 2-3 times daily.  - Referred the patient to a podiatrist for nail care and further management of the toe infection.  - Advised using gauze between toes to keep them  and dry, and applying antifungal cream.  - Cautioned the patient against being barefoot outside the house due to diabetic status.  - Educated the patient about the importance of proper  foot care for diabetic patients.    CELLULITIS:  - Assessed toe condition, noting swelling in addition to redness and drainage, indicating mild cellulitis.  - Prioritized addressing the toe infection over the scheduled root canal procedure.  - Patient to blot foot dry after soaking and elevate feet when sitting.    INTERDIGITAL FUNGAL INFECTION:  - Discussed the importance of keeping the affected area dry and allowing air circulation.    ONYCHOLYSIS:  - Noted the patient's nail condition, possibly indicating onycholysis.    .    FOLLOW UP:  - Instructed the patient to contact the office if a podiatry appointment cannot be scheduled soon, especially if the condition worsens over the weekend.             This note was generated with the assistance of ambient listening technology. Verbal consent was obtained by the patient and accompanying visitor(s) for the recording of patient appointment to facilitate this note

## 2025-03-06 NOTE — TELEPHONE ENCOUNTER
Pt daughter in law called for patient and is at home with him. Pt has one toe that is missing part of his nail and he also has a reddish purple wound on the pinky toe of the same foot. The wound has yellowish drainage today.  The toe is dark red and swollen today. Pt is diabetic and does not have feeling in his feet so is unable to say how the wounds occurred. Pt denies fever and pain. Care advice is to be seen in office today. No appt available today with pt PCP. Appt with a different provider in [facility] accepted and made for patient. Address of clinic verified with [facility].         Reason for Disposition   Looks infected (e.g., spreading redness, pus) and no fever    Additional Information   Negative: Widespread rash and bright red, sunburn-like and too weak to stand   Negative: Sounds like a life-threatening emergency to the triager   Negative: SEVERE pain in the wound   Negative: SEVERE pain with bending of finger (or toe) in wound on hand (or foot)   Negative: Bright red, wide-spread, sunburn-like rash   Negative: Fever > 103 F (39.4 C)   Negative: Black (necrotic), dark purple, or blisters develop in area of wound   Negative: Patient sounds very sick or weak to the triager   Negative: Looks infected (spreading redness, red streak, pus) and fever   Negative: Looks infected (spreading redness, pus) and face wound   Negative: Red streak runs from the wound and longer than 1 inch (2.5 cm)   Negative: Finger or toe wound and entire finger or toe swollen    Protocols used: Wound Infection [id number]

## 2025-03-07 ENCOUNTER — OFFICE VISIT (OUTPATIENT)
Dept: PODIATRY | Facility: CLINIC | Age: 85
End: 2025-03-07
Payer: MEDICARE

## 2025-03-07 VITALS — BODY MASS INDEX: 23.29 KG/M2 | WEIGHT: 153.69 LBS | HEIGHT: 68 IN

## 2025-03-07 DIAGNOSIS — I73.9 PAD (PERIPHERAL ARTERY DISEASE): ICD-10-CM

## 2025-03-07 DIAGNOSIS — E11.628 TYPE 2 DIABETES MELLITUS WITH OTHER SKIN COMPLICATION, WITHOUT LONG-TERM CURRENT USE OF INSULIN: ICD-10-CM

## 2025-03-07 DIAGNOSIS — B35.3 TINEA PEDIS OF RIGHT FOOT: ICD-10-CM

## 2025-03-07 DIAGNOSIS — L60.0 INGROWN TOENAIL OF RIGHT FOOT: ICD-10-CM

## 2025-03-07 DIAGNOSIS — L60.0 INGROWN TOENAIL: ICD-10-CM

## 2025-03-07 DIAGNOSIS — L98.8 SKIN MACERATION: ICD-10-CM

## 2025-03-07 DIAGNOSIS — L08.1 ERYTHRASMA: Primary | ICD-10-CM

## 2025-03-07 PROCEDURE — 99999 PR PBB SHADOW E&M-EST. PATIENT-LVL III: CPT | Mod: PBBFAC,,, | Performed by: PODIATRIST

## 2025-03-07 RX ORDER — ERYTHROMYCIN 20 MG/G
GEL TOPICAL 2 TIMES DAILY
Qty: 30 G | Refills: 0 | Status: SHIPPED | OUTPATIENT
Start: 2025-03-07

## 2025-03-07 NOTE — PROGRESS NOTES
"[facility] -   PODIATRIC MEDICINE AND SURGERY      CHIEF COMPLAINT   Chief Complaint   Patient presents with    Nail Problem     C/o macerated interspaces of toes, b/l, 0 pain, started Wed, pt saw PCP yesterday and prescribed antibiotic, pt clipped toenails, left great toe was irritated improved now, diabetic, wears sandals, last seen PCP Dr. Nash on 03/06/2025         HPI:    Brock Camara is a 85 y.o. male presenting to podiatry clinic with complaint of fungal infection on skin and toenail. He is accompanied with daughter in law and son. The patient has tried over the counter medications with some success, but the problem is recurrent and aggravating. Patient inquires about available treatment options. No further pedal complaints.    Hemoglobin A1C   Date Value Ref Range Status   12/30/2024 7.7 (H) 4.0 - 5.6 % Final     Comment:     ADA Screening Guidelines:  5.7-6.4%  Consistent with prediabetes  >or=6.5%  Consistent with diabetes    High levels of fetal hemoglobin interfere with the HbA1C  assay. Heterozygous hemoglobin variants (HbS, HgC, etc)do  not significantly interfere with this assay.   However, presence of multiple variants may affect accuracy.           PMH  Past Medical History:   Diagnosis Date    Diabetes mellitus, type 2     Hyperlipidemia        PROBLEM LIST  Patient Active Problem List    Diagnosis Date Noted    Type 2 diabetes mellitus, without long-term current use of insulin 12/30/2024    Erectile dysfunction following radiation therapy 04/25/2023    Prostate cancer 10/05/2021    Dysuria 10/05/2021       MEDS  Medications Ordered Prior to Encounter[1]    PSH     Past Surgical History:   Procedure Laterality Date    SHOULDER SURGERY          ALL  Review of patient's allergies indicates:   Allergen Reactions    Adhesive      Pt states allergic to "Paper Tape" = rash       SOC   Social History[2]      Family HX  No family history on file.         REVIEW OF SYSTEMS  General: " "Denies any fever or chills  Chest: Denies shortness of breath, wheezing, coughing, or sputum production  Heart: Denies chest pain, cold extremities, orthopenia, or reduced exercise tolerance  As noted above and per history of current illness above, otherwise negative in the remainder of the 14 systems.      PHYSICAL EXAM:      Vitals:    03/07/25 1441   Weight: 69.7 kg (153 lb 10.6 oz)   Height: 5' 8" (1.727 m)   PainSc: 0-No pain       General: This patient is well-developed, well-nourished and appears stated age, well-oriented to person, place and time, and cooperative and pleasant on today's visit    LOWER EXTREMITY PHYSICAL EXAM  VASCULAR  Dorsalis pedis and posterior tibial pulses palpable 1/4 bilaterally.   Capillary refill time immediate to the toes.   Feet are warm to the touch. Skin temperature warm to warm from proximally to distally   There are varicosities, telangiectasias noted to bilateral foot and ankle regions.   There are no ecchymoses noted to bilateral foot and ankle regions.   There is gross lower extremity edema.    DERMATOLOGIC  4th webspace b/l maceration with scales and drainage  Right hallux no acute SOI  There is onychomycosis noted     NEUROLOGIC  Epicritic sensation is intact as the patient is able to sense light touch to bilateral foot and ankle regions.   Achilles and patellar deep tendon reflexes intact  Babinski reflex absent    ORTHOPEDIC/BIOMECHANICAL  No symptomatic structural abnormalities noted  Muscle strength AT/EHL/EDL/PT: 5/5; Achilles/Gastroc/Soleus: 5/5; PB/PL: 5/5 Muscle tone is normal.  Ankle joint ROM INTACT DF/PF, non-crepitus      ASSESSMENT   Erythrasma    Ingrown toenail of right foot- Resolved    Skin maceration    PAD (peripheral artery disease)  -     US Lower Extremity Arteries Bilateral; Future; Expected date: 03/07/2025    Other orders  -     erythromycin with ethanoL (ERYGEL) 2 % gel; Apply topically 2 (two) times daily.  Dispense: 30 g; Refill: " 0        PLAN    1. Patient was educated about clinical and imaging findings, and verbalizes understanding of above.  2.   For the corynebacteria infection, patient was prescribed  erythromycin topical gel twice daily. In addition, recommendations were made to spray and disinfect shoes with Lysol and to alternate shoes. Also, for excessive sweating, patient was instructed to use Arid Extra Dry spray on the bottom of the feet with Tinactin powder in the digital interspaces.      3. RTC  for follow up/evaluation as scheduled      Report Electronically Signed By:     Kim Saucedo DPM   Podiatry  [facility] [place]  3/7/2025                     [1]   Current Outpatient Medications on File Prior to Visit   Medication Sig Dispense Refill    JARDIANCE 25 mg tablet Take 1 tablet (25 mg total) by mouth once daily. 90 tablet 3    ketoconazole (NIZORAL) 2 % cream Apply topically 2 (two) times daily. To affected area on foot and toes 15 g 0    loratadine (CLARITIN) 10 mg tablet Take 1 tablet (10 mg total) by mouth once daily. 90 tablet 3    metFORMIN (GLUCOPHAGE) 1000 MG tablet Take 1 tablet (1,000 mg total) by mouth 2 (two) times daily with meals. 180 tablet 3    potassium chloride SA (K-DUR,KLOR-CON) 20 MEQ tablet Take 2 tablets (40 mEq total) by mouth every morning. 180 tablet 3    simvastatin (ZOCOR) 40 MG tablet Take 1 tablet (40 mg total) by mouth once daily. 90 tablet 3    sulfamethoxazole-trimethoprim 800-160mg (BACTRIM DS) 800-160 mg Tab Take 1 tablet by mouth 2 (two) times daily. for 10 days 20 tablet 0    tamsulosin (FLOMAX) 0.4 mg Cap Take 2 capsules (0.8 mg total) by mouth once daily. 60 capsule 11     No current facility-administered medications on file prior to visit.   [2]   Social History  Tobacco Use    Smoking status: Former     Passive exposure: Past    Smokeless tobacco: Never   Substance Use Topics    Alcohol use: Not Currently    Drug use: Never

## 2025-03-07 NOTE — PATIENT INSTRUCTIONS
It was very nice meeting you today. I have enclosed further information about our visit today. Please let me know if you have any questions or concerns. Have a great day.    Instructions:    Things to Purchase Over the Counter (you can ask your pharmacist if any issues with finding the items. Most of the items will be located in the foot products area which are by the pharmacy at [facility], [facility], [facility], etc.    -Purchase the prescription for antifungal GEL which was sent to your pharmacy and apply as instructed.    List of Over the counter items to purchase:  1. Domeboro powder packets  2. Dial soap  3. Castellani paint OR Roll on deodorant  4. Zeasorb ANTIFUNGAL powder for the foot  5. Antifungal prescription that was sent to your pharmacist       You have been diagnosed with a FUNGAL INFECTION in your webspace.    Instructions:    1. Purchase Domeboro powder packets from local pharmacy store (such as [employer]); Follow instructions on the packet and dissolve the packet in cool water. Soak your foot the solution in COOL water for 15 to 30 minutes. Discard solution after each use. PERFORM for 7 DAYS.    2. After soaking, Make sure you dry out between your toes. It is very important to keep this area dry. Use antibacterial soap in the shower. Use antibacterial soap (DIAL) in the shower to wash your feet. Make sure you dry out between your toes. It is very important to keep this area dry.     3. Then apply the gel to your webspaces.    4.  Apply ZEASORB antifungal powder (this is purchased over the counter in the foot aisle by the pharmacy) to your feet and in your shoes daily; Wear cotton socks (80%) or open shoes and sandals.      *Perform the above instructions TWICE daily (in the morning and at Night) for about 2 weeks or until the infection clears. Your toe webspace should be CLEAN and no longer white or discolored when the infection is gone. *    If you have any questions, feel free to call my office at [phone].  Otherwise, I will see you in your follow up visit.     Sincerely,  Dr. Kim Saucedo, DPM      AthleteS Foot    Athletes Foot is caused by a fungal infection in the skin. It affects the skin between the toes where it causes fissures (cracks in the skin). It can also affect the bottom of the foot where it causes dry white scales and peeling of the skin. This infection is more likely to occur when the foot is in hot, sweaty socks and shoes for long periods of time.  This infection is treated with skin creams or oral medicine.  Home Care:  It is important to keep the feet dry. Use absorbent cotton socks and change them if they become sweaty; or wear an open-toe shoe or sandal. Wash the feet at least once a day with soap and water.  Apply the antifungal cream as prescribed. Some antifungal creams are available without a prescription (Lotrimin, Tinactin).  It may take a week before the rash starts to improve and it can take about three to four weeks to completely clear. Continue the medicine until the rash is all gone.  Use over-the-counter antifungal powders or sprays on your feet after exposure to high-risk environments (public showers, gyms and locker rooms) may prevent future infections. You may wish to use appropriate footwear to reduce exposure.  Follow Up  with your doctor as recommended by our staff if the rash is not starting to improve after TEN days of treatment, or if the rash continues to spread.  Get Prompt Medical Attention  if any of the following occur:  Increasing redness or swelling of the foot  Pus draining from cracks in the skin  Fever of 100.4ºF (38ºC) or higher, or as directed by your healthcare provider  © 3795-7569 [employer], [address] [place] [address], [place] [address]. All rights reserved. This information is not intended as a substitute for professional medical care. Always follow your healthcare professional's instructions.          Athletes Foot    Athletes Foot is caused by a  fungal infection in the skin. It affects the skin between the toes where it causes fissures (cracks in the skin). It can also affect the bottom of the foot where it causes dry white scales and peeling of the skin. This infection is more likely to occur when the foot is in hot, sweaty socks and shoes for long periods of time.  This infection is treated with skin creams or oral medicine.  Home Care:  It is important to keep the feet dry. Use absorbent cotton socks and change them if they become sweaty; or wear an open-toe shoe or sandal. Wash the feet at least once a day with soap and water.  Apply the antifungal cream as prescribed. Some antifungal creams are available without a prescription (Lotrimin, Tinactin).  It may take a week before the rash starts to improve and it can take about three to four weeks to completely clear. Continue the medicine until the rash is all gone.  Use over-the-counter antifungal powders or sprays on your feet after exposure to high-risk environments (public showers, gyms and locker rooms) may prevent future infections. You may wish to use appropriate footwear to reduce exposure.  Follow Up  with your doctor as recommended by our staff if the rash is not starting to improve after TEN days of treatment, or if the rash continues to spread.  Get Prompt Medical Attention  if any of the following occur:  Increasing redness or swelling of the foot  Pus draining from cracks in the skin  Fever of 100.4ºF (38ºC) or higher, or as directed by your healthcare provider  © 7248-1836 [employer] [facility], [address] [place] [address], [place] [address]. All rights reserved. This information is not intended as a substitute for professional medical care. Always follow your healthcare professional's instructions.

## 2025-03-10 ENCOUNTER — HOSPITAL ENCOUNTER (OUTPATIENT)
Dept: RADIOLOGY | Facility: HOSPITAL | Age: 85
Discharge: HOME | End: 2025-03-10
Attending: PODIATRIST
Payer: MEDICARE

## 2025-03-10 DIAGNOSIS — I73.9 PAD (PERIPHERAL ARTERY DISEASE): ICD-10-CM

## 2025-03-10 PROCEDURE — 93925 LOWER EXTREMITY STUDY: CPT | Mod: TC,PO

## 2025-03-10 PROCEDURE — 93925 LOWER EXTREMITY STUDY: CPT | Mod: 26,,, | Performed by: STUDENT IN AN ORGANIZED HEALTH CARE EDUCATION/TRAINING PROGRAM

## 2025-03-14 ENCOUNTER — RESULTS FOLLOW-UP (OUTPATIENT)
Dept: PODIATRY | Facility: CLINIC | Age: 85
End: 2025-03-14

## 2025-03-14 ENCOUNTER — TELEPHONE (OUTPATIENT)
Dept: PODIATRY | Facility: CLINIC | Age: 85
End: 2025-03-14
Payer: MEDICARE

## 2025-03-14 DIAGNOSIS — I73.9 PAD (PERIPHERAL ARTERY DISEASE): Primary | ICD-10-CM

## 2025-03-14 NOTE — TELEPHONE ENCOUNTER
----- Message from Kim Saucedo DPM sent at 3/14/2025 11:55 AM CDT -----  Place a referral with vascular and schedule consult, let pt know id like him evaluated  ----- Message -----  From: Interface, Rad Results In  Sent: 3/10/2025   4:17 PM CDT  To: Kim Saucedo DPM

## 2025-03-31 ENCOUNTER — OFFICE VISIT (OUTPATIENT)
Dept: PRIMARY CARE CLINIC | Facility: CLINIC | Age: 85
End: 2025-03-31
Payer: MEDICARE

## 2025-03-31 VITALS
OXYGEN SATURATION: 95 % | DIASTOLIC BLOOD PRESSURE: 60 MMHG | TEMPERATURE: 97 F | WEIGHT: 152.56 LBS | BODY MASS INDEX: 23.12 KG/M2 | HEART RATE: 62 BPM | HEIGHT: 68 IN | SYSTOLIC BLOOD PRESSURE: 106 MMHG

## 2025-03-31 DIAGNOSIS — E78.5 HYPERLIPIDEMIA, UNSPECIFIED HYPERLIPIDEMIA TYPE: ICD-10-CM

## 2025-03-31 DIAGNOSIS — E11.69 TYPE 2 DIABETES MELLITUS WITH OTHER SPECIFIED COMPLICATION, WITHOUT LONG-TERM CURRENT USE OF INSULIN: Primary | ICD-10-CM

## 2025-03-31 DIAGNOSIS — R53.1 GENERALIZED WEAKNESS: ICD-10-CM

## 2025-03-31 DIAGNOSIS — R26.89 BALANCE PROBLEMS: ICD-10-CM

## 2025-03-31 PROCEDURE — 99215 OFFICE O/P EST HI 40 MIN: CPT | Mod: S$GLB,,, | Performed by: FAMILY MEDICINE

## 2025-03-31 PROCEDURE — G2211 COMPLEX E/M VISIT ADD ON: HCPCS | Mod: S$GLB,,, | Performed by: FAMILY MEDICINE

## 2025-03-31 PROCEDURE — 3078F DIAST BP <80 MM HG: CPT | Mod: CPTII,S$GLB,, | Performed by: FAMILY MEDICINE

## 2025-03-31 PROCEDURE — 3074F SYST BP LT 130 MM HG: CPT | Mod: CPTII,S$GLB,, | Performed by: FAMILY MEDICINE

## 2025-03-31 PROCEDURE — 99999 PR PBB SHADOW E&M-EST. PATIENT-LVL IV: CPT | Mod: PBBFAC,,, | Performed by: FAMILY MEDICINE

## 2025-03-31 PROCEDURE — 3288F FALL RISK ASSESSMENT DOCD: CPT | Mod: CPTII,S$GLB,, | Performed by: FAMILY MEDICINE

## 2025-03-31 PROCEDURE — 1126F AMNT PAIN NOTED NONE PRSNT: CPT | Mod: CPTII,S$GLB,, | Performed by: FAMILY MEDICINE

## 2025-03-31 PROCEDURE — 1100F PTFALLS ASSESS-DOCD GE2>/YR: CPT | Mod: CPTII,S$GLB,, | Performed by: FAMILY MEDICINE

## 2025-03-31 PROCEDURE — 1159F MED LIST DOCD IN RCRD: CPT | Mod: CPTII,S$GLB,, | Performed by: FAMILY MEDICINE

## 2025-03-31 RX ORDER — TIRZEPATIDE 2.5 MG/.5ML
2.5 INJECTION, SOLUTION SUBCUTANEOUS
Qty: 2 ML | Refills: 12 | Status: SHIPPED | OUTPATIENT
Start: 2025-03-31

## 2025-03-31 RX ORDER — CALCIUM CARBONATE 160(400)MG
TABLET,CHEWABLE ORAL
Qty: 1 EACH | Refills: 0 | Status: SHIPPED | OUTPATIENT
Start: 2025-03-31

## 2025-03-31 NOTE — PATIENT INSTRUCTIONS
Overall, everything looks really good today.      All of your recent blood work looked really good.  The only thing that was abnormal was your blood sugar, A1c.    For that, let us stop the metformin.  Let us also lower the dose of Jardiance to 10 mg daily.  Instead, let us start taking Mounjaro 2.5 mg weekly.  This will do much more to control your blood sugar than the metformin did.  We will give it three months, then recheck your A1c, and adjust accordingly.    Your cholesterol levels look perfect.  Let us see if we can cut back on some medicine and stop taking the simvastatin.    For your feet and toenails, usually Podiatry manages fungal infections, toenails, but wounds.  Going to reach out to our podiatrist to see what she can do.  Alternatively, you can try scheduling an appointment with Dr. Rebecca Blakely at [facility].  You can call her office at [phone] to schedule an appointment.  If she needs an official referral from us, just let me know and I can fax one over.    In the meantime, keep your feet clean and dry.  You can leave them open to the air when at home.  Use white cotton socks when going out the house.  You may want to consider putting some gold bond powder in the socks to help keep them dry when wearing shoes.  At bedtime, apply a small amount of ketoconazole cream and work it in completely.    For balance instability, continue doing physical therapy.  You may also want to consider getting an upright walker to give better support at home.  You can search for them on [employer], [employer], other [employer].  Just Google upright walker.  See photo attached.  https://a.co/d/cTrv9pj    Handicap tag form given today.  Just bring this to the DMV or auto title place to get handicap tags.      Continue to eat a healthy diet.  Be careful with portion sizes.  Includes lots of fresh fruits, vegetables, whole grains, lean proteins.  See info below.    Keep hydrated.  Be sure to drink at least  8-10, 8 oz, glasses of water every day.    Stay active.  Try to do some sort of physical activity every day.  Nothing outrageous, just try walking for 10-15 minutes each day.

## 2025-04-01 DIAGNOSIS — Z00.00 ROUTINE GENERAL MEDICAL EXAMINATION AT HEALTH CARE FACILITY: Primary | ICD-10-CM

## 2025-04-01 DIAGNOSIS — Z76.89 ENCOUNTER TO ESTABLISH CARE WITH NEW DOCTOR: ICD-10-CM

## 2025-04-01 NOTE — PROGRESS NOTES
"    [facility]       [address] [place] [address]      Phone: [phone]      Fax: [phone]    Adam Kong MD                Office Visit  03/31/2025        Subjective      HPI:  Brock Camara is a 85 y.o. male presents today in clinic for "Follow-up ( 3 month )  ."     85-year-old gentleman presents today to discuss a couple of issues.    His son, Brock, and daughter-in-law, Halle, are present with him.  They provide most of the history.      Patient states he feels fine today.  No acute complaints.  No chest pain, shortness on breath.  No fever, chills, body aches.  No coughing, sneezing, URI type symptoms.  Appetite normal.  Bowel movements normal.  No urinary issues.    He would like to know if he can get rid of some of his medicines?    Has diabetes.  Last A1c was 7.7%.  Blood sugars tend to run about 160 at home.  Took Glucophage in the past, but that was discontinued because he would have hypoglycemic events.  Currently takes metformin 1000 mg twice a day, Jardiance 25 mg daily.    Has HTN.  Blood pressure tends to fluctuate, but generally in a good range.  Not currently on medication.      Has HLD.  Takes simvastatin 40 mg daily.  No issues with this medication.    Has some occasional issues with allergies.  Uses Claritin.    Has been having some balance issues.  Legs get weak.  Has been doing physical therapy.  Doing well with it.  Gaining some strength back.  They we would like to get a handicap tag for transportation.  Last visit, was using a single foot cane.  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  Does have a walker at home, but not Rollator.    States that he has been having issues with his feet.  Has a fungal infection between toes, on bottom of foot.  Recently had ingrown toenail.  Daughter was trying to cut and trim the nails, but kept getting wounds.  Saw a podiatrist, but they are unsure if they will be able to do regular now " maintenance because of insurance.  Recently had a ultrasound of the legs that showed decreased blood flow to the feet.  They has been referred to vascular and have an appointment on Wednesday.    PMH: Dm.  HTN.  HLD.  Chronic rhinitis.  Prostate cancer/radiation.  PSH: AC separation.  Tumor of buttock.  Allergy:  Adhesive tape.    Social: Retired.  Previously worked as a .  .  Lives with son, daughter-in-law.    T: Denies current use.  Quit in 1983.    A: Rarely   D: Denies    Exercise: Tries to walk when he can.  Was doing physical therapy at home.      Urology: Dr. Masters        The following were updated and reviewed by myself in the chart: medications, past medical history, past surgical history, family history, social history, and allergies.     Medications:  Medications Ordered Prior to Encounter[1]     PMHx:  Past Medical History:   Diagnosis Date    Diabetes mellitus, type 2     Hyperlipidemia       Patient Active Problem List    Diagnosis Date Noted    Type 2 diabetes mellitus, without long-term current use of insulin 12/30/2024    Erectile dysfunction following radiation therapy 04/25/2023    Prostate cancer 10/05/2021    Dysuria 10/05/2021        PSHx:  Past Surgical History:   Procedure Laterality Date    SHOULDER SURGERY          FHx:  No family history on file.     Social:  Social History     Socioeconomic History    Marital status: Single   Tobacco Use    Smoking status: Former     Passive exposure: Past    Smokeless tobacco: Never   Substance and Sexual Activity    Alcohol use: Not Currently    Drug use: Never    Sexual activity: Yes     Partners: Female     Social Drivers of Health     Financial Resource Strain: Low Risk  (10/22/2024)    Overall Financial Resource Strain (CARDIA)     Difficulty of Paying Living Expenses: Not hard at all   Food Insecurity: No Food Insecurity (10/22/2024)    Hunger Vital Sign     Worried About Running Out of Food in the Last  "Year: Never true     Ran Out of Food in the Last Year: Never true   Transportation Needs: No Transportation Needs (10/5/2024)    Received from [facility]    CESAR - Transportation     Lack of Transportation (Medical): No     Lack of Transportation (Non-Medical): No   Physical Activity: Insufficiently Active (10/22/2024)    Exercise Vital Sign     Days of Exercise per Week: 5 days     Minutes of Exercise per Session: 20 min   Stress: No Stress Concern Present (10/22/2024)    [employer] - Occupational Stress Questionnaire     Feeling of Stress : Only a little   Housing Stability: Unknown (10/22/2024)    Housing Stability Vital Sign     Unable to Pay for Housing in the Last Year: No        Allergies:  Review of patient's allergies indicates:   Allergen Reactions    Adhesive      Pt states allergic to "Paper Tape" = rash        ROS:  Review of Systems   Constitutional:  Negative for activity change, appetite change, chills and fever.   HENT:  Negative for congestion, postnasal drip, rhinorrhea, sore throat and trouble swallowing.    Respiratory:  Negative for cough and shortness of breath.    Cardiovascular:  Negative for chest pain and palpitations.   Gastrointestinal:  Negative for abdominal pain, constipation, diarrhea, nausea and vomiting.   Genitourinary:  Negative for difficulty urinating.   Musculoskeletal:  Positive for gait problem. Negative for arthralgias and myalgias.   Neurological:  Positive for weakness. Negative for headaches.   All other systems reviewed and are negative.         Objective      /60 (BP Location: Right arm, Patient Position: Sitting)   Pulse 62   Temp 97.2 °F (36.2 °C) (Tympanic)   Ht 5' 8" (1.727 m)   Wt 69.2 kg (152 lb 8.9 oz)   SpO2 95%   BMI 23.20 kg/m²   Ht Readings from Last 3 Encounters:   03/31/25 5' 8" (1.727 m)   03/07/25 5' 8" (1.727 m)   03/06/25 5' 8" (1.727 " m)     Wt Readings from Last 3 Encounters:   03/31/25 69.2 kg (152 lb 8.9 oz)   03/07/25 69.7 kg (153 lb 10.6 oz)   03/06/25 69.7 kg (153 lb 10.6 oz)       PHYSICAL EXAM:  Physical Exam  Vitals and nursing note reviewed.   Constitutional:       General: He is not in acute distress.     Appearance: Normal appearance.   HENT:      Head: Normocephalic and atraumatic.      Right Ear: Tympanic membrane, ear canal and external ear normal.      Left Ear: Tympanic membrane, ear canal and external ear normal.      Nose: Nose normal. No congestion or rhinorrhea.      Mouth/Throat:      Mouth: Mucous membranes are moist.      Pharynx: Oropharynx is clear. No oropharyngeal exudate or posterior oropharyngeal erythema.   Eyes:      Extraocular Movements: Extraocular movements intact.      Conjunctiva/sclera: Conjunctivae normal.      Pupils: Pupils are equal, round, and reactive to light.   Cardiovascular:      Rate and Rhythm: Normal rate and regular rhythm.   Pulmonary:      Effort: Pulmonary effort is normal.      Breath sounds: No wheezing, rhonchi or rales.   Musculoskeletal:         General: Normal range of motion.      Cervical back: Normal range of motion.   Lymphadenopathy:      Cervical: No cervical adenopathy.   Skin:     General: Skin is warm and dry.   Neurological:      General: No focal deficit present.      Mental Status: He is alert.              LABS / IMAGING:  Recent Results (from the past 26 weeks)   TROPONIN    Collection Time: 10/05/24  4:07 AM   Result Value Ref Range    Troponin I 0.02 <0.01 - 0.03 ng/mL   CK    Collection Time: 10/05/24  4:07 AM   Result Value Ref Range    CPK 1,011 (H) 30 - 200 U/L   LACTIC ACID, PLASMA    Collection Time: 10/05/24  4:07 AM   Result Value Ref Range    Lactic Acid 1.5 0.5 - 2.2 mmol/L   Birgit 2 Flu + SARS Antigen NAZANIN -Use Dry Swab    Collection Time: 10/05/24  4:41 AM   Result Value Ref Range    Inflenza A Ag Negative Negative    Influenza B Ag Negative Negative     COVID-19 Ag Negative Negative   CK    Collection Time: 10/05/24 10:53 AM   Result Value Ref Range     (H) 30 - 200 U/L   CREATININE, SERUM    Collection Time: 10/06/24 10:16 AM   Result Value Ref Range    Creatinine 0.93 0.73 - 1.18 mg/dL    eGFR African American 81 mL/min/1.73mSq   BASIC METABOLIC PANEL    Collection Time: 10/07/24  5:37 AM   Result Value Ref Range    Creatinine 0.82 0.73 - 1.18 mg/dL    Blood Urea Nitrogen 10 5 - 25 mg/dL    Sodium 133 (L) 136 - 145 mmol/L    Potassium 3.3 (L) 3.5 - 5.1 mmol/L    Chloride 104 100 - 109 mmol/L    Carbon Dioxide 21 (L) 22 - 33 mmol/L    Glucose Screen 149 (H) 70 - 100 mg/dL    Calcium 8.3 (L) 8.8 - 10.6 mg/dL    Anion Gap 8 8 - 16 mmol/L    eGFR African American 87 mL/min/1.73mSq   POCT GLUCOSE    Collection Time: 10/13/24  6:33 PM   Result Value Ref Range    POC Glucose 81 70 - 100 mg/dL   CK    Collection Time: 10/13/24  6:39 PM   Result Value Ref Range    CPK 52 30 - 200 U/L   TROPONIN    Collection Time: 10/13/24  6:39 PM   Result Value Ref Range    Troponin I <0.01 <0.01 - 0.03 ng/mL   LACTIC ACID, PLASMA    Collection Time: 10/13/24  6:39 PM   Result Value Ref Range    Lactic Acid 2.0 0.5 - 2.2 mmol/L   Birgit 2 Flu + SARS Antigen NAZANIN -Use Dry Swab    Collection Time: 10/13/24  6:46 PM   Result Value Ref Range    Inflenza A Ag Negative Negative    Influenza B Ag Negative Negative    COVID-19 Ag Negative Negative   POCT Bladder Scan    Collection Time: 10/23/24  2:16 PM   Result Value Ref Range    POC Residual Urine Volume 389 (A) 0 - 100 mL    POC Residual Urine Volume 0 0 - 100 mL   POCT Urinalysis, Dipstick, Automated, W/O Scope    Collection Time: 10/23/24  2:20 PM   Result Value Ref Range    POC Blood, Urine Positive (A) Negative    POC Bilirubin, Urine Negative Negative    POC Urobilinogen, Urine 0.2 0.3 - 2.2    POC Ketones, Urine Negative Negative    POC Protein, Urine Negative Negative    POC Nitrates, Urine Negative Negative    POC Glucose,  Urine Positive (A) Negative    pH, UA 5.5     POC Specific Gravity, Urine <=1.005 1.003 - 1.029    POC Leukocytes, Urine Positive (A) Negative   Urine culture    Collection Time: 10/23/24  2:59 PM    Specimen: Urine, Clean Catch   Result Value Ref Range    Urine Culture, Routine       Multiple organisms isolated. None in predominance.  Repeat if    Urine Culture, Routine clinically necessary.    Prostate Specific Antigen, Diagnostic    Collection Time: 10/23/24  3:08 PM   Result Value Ref Range    PSA Diagnostic 0.05 0.00 - 4.00 ng/mL   POCT Bladder Scan    Collection Time: 11/05/24 11:29 AM   Result Value Ref Range    POC Residual Urine Volume 58 0 - 100 mL   Urine culture    Collection Time: 11/05/24 11:40 AM    Specimen: Urine, Clean Catch   Result Value Ref Range    Urine Culture, Routine CITROBACTER KOSERI  50,000 - 99,999 cfu/ml   (A)        Susceptibility    Citrobacter koseri - CULTURE, URINE     Amp/Sulbactam <=8/4 Sensitive mcg/mL     Ceftriaxone <=1 Sensitive mcg/mL     Cefazolin <=2 Sensitive mcg/mL     Ciprofloxacin <=0.25 Sensitive mcg/mL     Cefepime <=2 Sensitive mcg/mL     Ertapenem <=0.5 Sensitive mcg/mL     Nitrofurantoin 64 Intermediate mcg/mL     Gentamicin <=2 Sensitive mcg/mL     Levofloxacin <=0.5 Sensitive mcg/mL     Meropenem <=1 Sensitive mcg/mL     Piperacillin/Tazo <=8 Sensitive mcg/mL     Trimeth/Sulfa <=2/38 Sensitive mcg/mL     Tobramycin 4 Intermediate mcg/mL   POCT Urinalysis, Dipstick, Automated, W/O Scope    Collection Time: 11/05/24  3:43 PM   Result Value Ref Range    POC Blood, Urine Positive (A) Negative    POC Bilirubin, Urine Negative Negative    POC Urobilinogen, Urine 0.2 (A) 0.3 - 2.2    POC Ketones, Urine Negative Negative    POC Protein, Urine Negative Negative    POC Nitrates, Urine Negative Negative    POC Glucose, Urine Positive (A) Negative    pH, UA 5.0     POC Specific Gravity, Urine 1.010 1.003 - 1.029    POC Leukocytes, Urine Positive (A) Negative   POCT  Urinalysis, Dipstick, Automated, W/O Scope    Collection Time: 12/18/24 11:01 AM   Result Value Ref Range    POC Blood, Urine Negative Negative    POC Bilirubin, Urine Negative Negative    POC Urobilinogen, Urine 0.2 (A) 0.3 - 2.2    POC Ketones, Urine Negative Negative    POC Protein, Urine Negative Negative    POC Nitrates, Urine Negative Negative    POC Glucose, Urine Positive (A) Negative    pH, UA 5.0     POC Specific Gravity, Urine 1.015 1.003 - 1.029    POC Leukocytes, Urine Negative Negative   POCT Bladder Scan    Collection Time: 12/18/24 11:02 AM   Result Value Ref Range    POC Residual Urine Volume 33 0 - 100 mL   CBC Auto Differential    Collection Time: 12/30/24 12:39 PM   Result Value Ref Range    WBC 7.75 3.90 - 12.70 K/uL    RBC 4.29 (L) 4.60 - 6.20 M/uL    Hemoglobin 13.2 (L) 14.0 - 18.0 g/dL    Hematocrit 39.6 (L) 40.0 - 54.0 %    MCV 92 82 - 98 fL    MCH 30.8 27.0 - 31.0 pg    MCHC 33.3 32.0 - 36.0 g/dL    RDW 13.9 11.5 - 14.5 %    Platelets 260 150 - 450 K/uL    MPV 11.2 9.2 - 12.9 fL    Immature Granulocytes 0.1 0.0 - 0.5 %    Gran # (ANC) 5.4 1.8 - 7.7 K/uL    Immature Grans (Abs) 0.01 0.00 - 0.04 K/uL    Lymph # 1.3 1.0 - 4.8 K/uL    Mono # 0.6 0.3 - 1.0 K/uL    Eos # 0.4 0.0 - 0.5 K/uL    Baso # 0.05 0.00 - 0.20 K/uL    nRBC 0 0 /100 WBC    Gran % 69.8 38.0 - 73.0 %    Lymph % 16.6 (L) 18.0 - 48.0 %    Mono % 7.4 4.0 - 15.0 %    Eosinophil % 5.5 0.0 - 8.0 %    Basophil % 0.6 0.0 - 1.9 %    Differential Method Automated    Comprehensive Metabolic Panel    Collection Time: 12/30/24 12:39 PM   Result Value Ref Range    Sodium 139 136 - 145 mmol/L    Potassium 4.6 3.5 - 5.1 mmol/L    Chloride 108 95 - 110 mmol/L    CO2 21 (L) 23 - 29 mmol/L    Glucose 168 (H) 70 - 110 mg/dL    BUN 17 8 - 23 mg/dL    Creatinine 0.8 0.5 - 1.4 mg/dL    Calcium 9.7 8.7 - 10.5 mg/dL    Total Protein 6.7 6.0 - 8.4 g/dL    Albumin 3.9 3.5 - 5.2 g/dL    Total Bilirubin 0.6 0.1 - 1.0 mg/dL    Alkaline Phosphatase 61 40 -  150 U/L    AST 25 10 - 40 U/L    ALT 34 10 - 44 U/L    eGFR >60.0 >60 mL/min/1.73 m^2    Anion Gap 10 8 - 16 mmol/L   TSH    Collection Time: 12/30/24 12:39 PM   Result Value Ref Range    TSH 1.053 0.400 - 4.000 uIU/mL   Lipid Panel    Collection Time: 12/30/24 12:39 PM   Result Value Ref Range    Cholesterol 151 120 - 199 mg/dL    Triglycerides 96 30 - 150 mg/dL    HDL 64 40 - 75 mg/dL    LDL Cholesterol 67.8 63.0 - 159.0 mg/dL    HDL/Cholesterol Ratio 42.4 20.0 - 50.0 %    Total Cholesterol/HDL Ratio 2.4 2.0 - 5.0    Non-HDL Cholesterol 87 mg/dL   Hemoglobin A1C    Collection Time: 12/30/24 12:39 PM   Result Value Ref Range    Hemoglobin A1C 7.7 (H) 4.0 - 5.6 %    Estimated Avg Glucose 174 (H) 68 - 131 mg/dL   Microalbumin/Creatinine Ratio, Urine    Collection Time: 12/30/24 12:52 PM   Result Value Ref Range    Microalbumin, Urine <5.0 ug/mL    Creatinine, Urine 45.0 23.0 - 375.0 mg/dL    Microalb/Creat Ratio Unable to calculate 0.0 - 30.0 ug/mg         Assessment    1. Type 2 diabetes mellitus with other specified complication, without long-term current use of insulin    2. Hyperlipidemia, unspecified hyperlipidemia type    3. Generalized weakness    4. Balance problems          Moy Gutiérrez was seen today for follow-up.    Diagnoses and all orders for this visit:    Type 2 diabetes mellitus with other specified complication, without long-term current use of insulin  -     empagliflozin (JARDIANCE) 10 mg tablet; Take 1 tablet (10 mg total) by mouth once daily.  -     tirzepatide (MOUNJARO) 2.5 mg/0.5 mL PnIj; Inject 2.5 mg into the skin every 7 days.  -     Hemoglobin A1C; Future    Hyperlipidemia, unspecified hyperlipidemia type    Generalized weakness  -     walker (ULTRA-LIGHT ROLLATOR) Misc; Please dispense one ultralight Rollator, upright, walker to be used for ambulation.    Balance problems  -     walker (ULTRA-LIGHT ROLLATOR) Misc; Please dispense one ultralight Rollator, upright, walker to be used for  ambulation.    Physically, he looks good today.      Handicap form given.      Weight has been stable.      Reviewed ultrasound report.  Looks like he does have some decreased blood flow in the right posterior tibial artery, left dorsalis pedis artery.  Has an appointment with vascular Cardiology on Wednesday.      Reviewed recent labs.      Cholesterol levels are excellent.  Given his age, we will stop cholesterol medicine at this time.      A1c at 7.7%.  Have him stop metformin.  Decrease Jardiance to 10 mg daily.  Start Mounjaro 2.5 mg weekly.  Recheck A1c in three months.  If we can get sugar under better control, might have fewer issues with the feet, less likely to get diabetic foot wounds, infections.  May also help with fungal infection.    We will reach out to our podiatrist to get clarification on foot care.  Alternatively, they can see Dr. Blakely at [facility].    Continue physical therapy.  Recommended upright rolling walker to see if that would help with balance and mobility.  Do not think insurance will cover it, so gave info for [employer].    FOLLOW-UP:  Follow up in about 3 months (around 6/30/2025) for recheck, labs 1 week prior.    I spent a total of 40 minutes face to face and non-face to face on the date of this visit.This includes time preparing to see the patient (eg, review of tests, notes), obtaining and/or reviewing additional history from an independent historian and/or outside medical records, documenting clinical information in the electronic health record, independently interpreting results and/or communicating results to the patient/family/caregiver, or care coordinator.  Visit today included increased complexity associated with the care of the episodic problem addressed and managing the longitudinal care of the patient due to the serious and/or complex managed problem(s).    Signed by:  Adam Kong MD       [1]   Current Outpatient Medications on File Prior to Visit   Medication  Sig Dispense Refill    ketoconazole (NIZORAL) 2 % cream Apply topically 2 (two) times daily. To affected area on foot and toes 15 g 0    loratadine (CLARITIN) 10 mg tablet Take 1 tablet (10 mg total) by mouth once daily. 90 tablet 3    tamsulosin (FLOMAX) 0.4 mg Cap Take 2 capsules (0.8 mg total) by mouth once daily. 60 capsule 11    [DISCONTINUED] erythromycin with ethanoL (ERYGEL) 2 % gel Apply topically 2 (two) times daily. 30 g 0    [DISCONTINUED] JARDIANCE 25 mg tablet Take 1 tablet (25 mg total) by mouth once daily. 90 tablet 3    [DISCONTINUED] metFORMIN (GLUCOPHAGE) 1000 MG tablet Take 1 tablet (1,000 mg total) by mouth 2 (two) times daily with meals. 180 tablet 3    [DISCONTINUED] potassium chloride SA (K-DUR,KLOR-CON) 20 MEQ tablet Take 2 tablets (40 mEq total) by mouth every morning. 180 tablet 3    [DISCONTINUED] simvastatin (ZOCOR) 40 MG tablet Take 1 tablet (40 mg total) by mouth once daily. 90 tablet 3     No current facility-administered medications on file prior to visit.

## 2025-04-02 ENCOUNTER — OFFICE VISIT (OUTPATIENT)
Dept: CARDIOLOGY | Facility: CLINIC | Age: 85
End: 2025-04-02
Payer: MEDICARE

## 2025-04-02 ENCOUNTER — HOSPITAL ENCOUNTER (OUTPATIENT)
Dept: CARDIOLOGY | Facility: HOSPITAL | Age: 85
Discharge: HOME | End: 2025-04-02
Attending: INTERNAL MEDICINE
Payer: MEDICARE

## 2025-04-02 VITALS
DIASTOLIC BLOOD PRESSURE: 64 MMHG | WEIGHT: 156.5 LBS | OXYGEN SATURATION: 96 % | BODY MASS INDEX: 23.8 KG/M2 | SYSTOLIC BLOOD PRESSURE: 126 MMHG | HEART RATE: 68 BPM

## 2025-04-02 DIAGNOSIS — I10 ESSENTIAL HYPERTENSION: Primary | ICD-10-CM

## 2025-04-02 DIAGNOSIS — I73.9 PAD (PERIPHERAL ARTERY DISEASE): ICD-10-CM

## 2025-04-02 DIAGNOSIS — K21.9 GASTROESOPHAGEAL REFLUX DISEASE, UNSPECIFIED WHETHER ESOPHAGITIS PRESENT: ICD-10-CM

## 2025-04-02 DIAGNOSIS — E78.5 HYPERLIPIDEMIA, UNSPECIFIED HYPERLIPIDEMIA TYPE: ICD-10-CM

## 2025-04-02 DIAGNOSIS — E11.21 TYPE 2 DIABETES MELLITUS WITH DIABETIC NEPHROPATHY, WITHOUT LONG-TERM CURRENT USE OF INSULIN: ICD-10-CM

## 2025-04-02 DIAGNOSIS — Z76.89 ENCOUNTER TO ESTABLISH CARE WITH NEW DOCTOR: ICD-10-CM

## 2025-04-02 DIAGNOSIS — Z00.00 ROUTINE GENERAL MEDICAL EXAMINATION AT HEALTH CARE FACILITY: ICD-10-CM

## 2025-04-02 DIAGNOSIS — E55.9 VITAMIN D DEFICIENCY: ICD-10-CM

## 2025-04-02 PROBLEM — N18.31 STAGE 3A CHRONIC KIDNEY DISEASE: Status: ACTIVE | Noted: 2024-10-05

## 2025-04-02 PROBLEM — E11.9 DIABETES MELLITUS: Status: ACTIVE | Noted: 2018-06-08

## 2025-04-02 LAB
OHS QRS DURATION: 124 MS
OHS QTC CALCULATION: 438 MS

## 2025-04-02 PROCEDURE — 99999 PR PBB SHADOW E&M-EST. PATIENT-LVL III: CPT | Mod: PBBFAC,,, | Performed by: INTERNAL MEDICINE

## 2025-04-02 PROCEDURE — 93005 ELECTROCARDIOGRAM TRACING: CPT

## 2025-04-02 PROCEDURE — 93010 ELECTROCARDIOGRAM REPORT: CPT | Mod: ,,, | Performed by: INTERNAL MEDICINE

## 2025-04-02 RX ORDER — MULTIVITAMIN
1 TABLET ORAL DAILY
COMMUNITY

## 2025-04-02 NOTE — PROGRESS NOTES
Subjective:   Patient ID:  Brock Camara is a 85 y.o. male who presents for evaluation of Establish Care and PAD       HPI  An 86 yo amle with diabetes  hlp htn ckd is here for evaluation of pvd.he has fungus in [facility] medical therapy arterial duplex performed showed some infrapopliteal disease. He is not on asa  no statins. He can walk up to 0.5 miles no claudication.A1c uncontrolled. He was placed on mounjaro   Past Medical History:   Diagnosis Date    Diabetes mellitus, type 2     Hyperlipidemia        Past Surgical History:   Procedure Laterality Date    SHOULDER SURGERY      when he was 25 years old       Social History[1]    No family history on file.    Current Outpatient Medications   Medication Sig    empagliflozin (JARDIANCE) 10 mg tablet Take 1 tablet (10 mg total) by mouth once daily. (Patient taking differently: Take 10 mg by mouth once daily. 4/2/25 Not picked up the lower dose yet)    ketoconazole (NIZORAL) 2 % cream Apply topically 2 (two) times daily. To affected area on foot and toes    loratadine (CLARITIN) 10 mg tablet Take 1 tablet (10 mg total) by mouth once daily.    multivitamin (ONE DAILY MULTIVITAMIN) per tablet Take 1 tablet by mouth once daily.    tamsulosin (FLOMAX) 0.4 mg Cap Take 2 capsules (0.8 mg total) by mouth once daily.    tirzepatide (MOUNJARO) 2.5 mg/0.5 mL PnIj Inject 2.5 mg into the skin every 7 days. (Patient taking differently: Inject 2.5 mg into the skin every 7 days. 4/2/25 has not started yet)    walker (ULTRA-LIGHT ROLLATOR) Misc Please dispense one ultralight Rollator, upright, walker to be used for ambulation. (Patient taking differently: 4/2/25 has not picked up yet   Please dispense one ultralight Rollator, upright, walker to be used for ambulation.)     No current facility-administered medications for this visit.     Current Outpatient Medications on File Prior to Visit   Medication Sig    empagliflozin (JARDIANCE) 10 mg tablet Take 1 tablet (10 mg  "total) by mouth once daily. (Patient taking differently: Take 10 mg by mouth once daily. 4/2/25 Not picked up the lower dose yet)    ketoconazole (NIZORAL) 2 % cream Apply topically 2 (two) times daily. To affected area on foot and toes    loratadine (CLARITIN) 10 mg tablet Take 1 tablet (10 mg total) by mouth once daily.    multivitamin (ONE DAILY MULTIVITAMIN) per tablet Take 1 tablet by mouth once daily.    tamsulosin (FLOMAX) 0.4 mg Cap Take 2 capsules (0.8 mg total) by mouth once daily.    tirzepatide (MOUNJARO) 2.5 mg/0.5 mL PnIj Inject 2.5 mg into the skin every 7 days. (Patient taking differently: Inject 2.5 mg into the skin every 7 days. 4/2/25 has not started yet)    walker (ULTRA-LIGHT ROLLATOR) Misc Please dispense one ultralight Rollator, upright, walker to be used for ambulation. (Patient taking differently: 4/2/25 has not picked up yet   Please dispense one ultralight Rollator, upright, walker to be used for ambulation.)     No current facility-administered medications on file prior to visit.       Review of patient's allergies indicates:   Allergen Reactions    Adhesive      Pt states allergic to "Paper Tape" = rash       Review of Systems   Constitutional: Negative for malaise/fatigue.   Eyes:  Negative for blurred vision.   Cardiovascular:  Negative for chest pain, claudication, cyanosis, dyspnea on exertion, irregular heartbeat, leg swelling, near-syncope, orthopnea, palpitations and paroxysmal nocturnal dyspnea.   Respiratory:  Negative for cough, hemoptysis and shortness of breath.    Hematologic/Lymphatic: Negative for bleeding problem. Does not bruise/bleed easily.   Skin:  Positive for color change. Negative for dry skin and itching.   Musculoskeletal:  Negative for falls, muscle weakness and myalgias.   Gastrointestinal:  Negative for abdominal pain, diarrhea, heartburn, hematemesis, hematochezia and melena.   Genitourinary:  Negative for flank pain and hematuria.   Neurological:  Negative " for dizziness, focal weakness, headaches, light-headedness, numbness, paresthesias, seizures and weakness.   Psychiatric/Behavioral:  Negative for altered mental status and memory loss. The patient is not nervous/anxious.    Allergic/Immunologic: Negative for hives.       Objective:   Physical Exam  Vitals and nursing note reviewed.   Constitutional:       General: He is not in acute distress.     Appearance: He is well-developed. He is not diaphoretic.   HENT:      Head: Normocephalic and atraumatic.   Eyes:      General:         Right eye: No discharge.         Left eye: No discharge.      Pupils: Pupils are equal, round, and reactive to light.   Neck:      Thyroid: No thyromegaly.      Vascular: No JVD.   Cardiovascular:      Rate and Rhythm: Normal rate and regular rhythm.      Pulses: Normal pulses and intact distal pulses.      Heart sounds: Normal heart sounds. No murmur heard.     No friction rub. No gallop.      Comments: Triphasic waveforms in both dp and pt  Pulmonary:      Effort: Pulmonary effort is normal. No respiratory distress.      Breath sounds: Normal breath sounds. No wheezing or rales.   Chest:      Chest wall: No tenderness.   Abdominal:      General: Bowel sounds are normal. There is no distension.      Palpations: Abdomen is soft.      Tenderness: There is no abdominal tenderness.   Musculoskeletal:         General: Normal range of motion.      Cervical back: Neck supple.      Right lower leg: No edema.      Left lower leg: No edema.   Skin:     General: Skin is warm and dry.      Findings: No erythema or rash.   Neurological:      Mental Status: He is alert and oriented to person, place, and time.      Cranial Nerves: No cranial nerve deficit.   Psychiatric:         Behavior: Behavior normal.       Vitals:    04/02/25 1130 04/02/25 1131   BP: 126/70 126/64   BP Location: Right arm Left arm   Patient Position: Sitting Sitting   Pulse: 68    SpO2: 96%    Weight: 71 kg (156 lb 8.4 oz)      Lab  "Results   Component Value Date    CHOL 151 12/30/2024     Body mass index is 23.8 kg/m².   Lab Results   Component Value Date    HGBA1C 7.7 (H) 12/30/2024      BMP  Lab Results   Component Value Date     12/30/2024    K 4.6 12/30/2024     12/30/2024    CO2 21 (L) 12/30/2024    BUN 17 12/30/2024    CREATININE 0.8 12/30/2024    CALCIUM 9.7 12/30/2024    ANIONGAP 10 12/30/2024    EGFRNORACEVR >60.0 12/30/2024      Lab Results   Component Value Date    HDL 64 12/30/2024     Lab Results   Component Value Date    LDLCALC 67.8 12/30/2024     Lab Results   Component Value Date    TRIG 96 12/30/2024     Lab Results   Component Value Date    CHOLHDL 42.4 12/30/2024       Chemistry        Component Value Date/Time     12/30/2024 1239    K 4.6 12/30/2024 1239     12/30/2024 1239    CO2 21 (L) 12/30/2024 1239    BUN 17 12/30/2024 1239    CREATININE 0.8 12/30/2024 1239     (H) 12/30/2024 1239        Component Value Date/Time    CALCIUM 9.7 12/30/2024 1239    ALKPHOS 61 12/30/2024 1239    AST 25 12/30/2024 1239    ALT 34 12/30/2024 1239    BILITOT 0.6 12/30/2024 1239    ESTGFRAFRICA 87 10/07/2024 0537          Lab Results   Component Value Date    TSH 1.053 12/30/2024     No results found for: "INR", "PROTIME"  Lab Results   Component Value Date    WBC 7.75 12/30/2024    HGB 13.2 (L) 12/30/2024    HCT 39.6 (L) 12/30/2024    MCV 92 12/30/2024     12/30/2024     BNP  @LABRCNTIP(BNP,BNPTRIAGEBLO)@  CrCl cannot be calculated (Patient's most recent lab result is older than the maximum 7 days allowed.).  Narrative & Impression  EXAM: US LOWER EXTREMITY ARTERIES BILATERAL     CLINICAL HISTORY: [I73.9]-Peripheral vascular disease, unspecified.     COMPARISON:  No relevant prior studies.     TECHNIQUE: Real-time sonography bilateral lower extremity arteries with color and spectral Doppler.     FINDINGS:  Waveforms and peak systolic velocities are as follows (all velocities provided in centimeters per " second):     Right lower extremity:  Common femoral artery: 122, triphasic  Deep femoral artery: 120, biphasic  Proximal SFA: 116, triphasic  Mid SFA: 83, triphasic  Distal SFA: 77, triphasic     Popliteal artery: 62, triphasic  Peroneal artery: 61, triphasic  Posterior tibial artery: 122, triphasic  Anterior tibial artery: 44, triphasic  Dorsalis pedis artery: 80, triphasic     Left lower extremity:  Common femoral artery: 97, triphasic  Deep femoral artery: 104, triphasic  Proximal SFA: 81, triphasic  Mid SFA: 103, triphasic  Distal SFA: 63, triphasic     Popliteal artery: 59, triphasic  Peroneal artery: 77, triphasic  Posterior tibial artery: 61, triphasic  Anterior tibial artery: 39, triphasic  Dorsalis pedis artery: 92, triphasic        Impression:     Elevated velocity within the right posterior tibial artery and left dorsalis pedis artery, which may indicate hemodynamically significant stenoses within these vessels.     Report Date: 3/10/2025 4:15 PM     Finalized on: 3/10/2025 4:15 PM By:  Destinee Kruger MD  [facility]# [id number]      2025-03-10 16:17:[id number]     [facility]    Assessment:     1. Essential hypertension    2. PAD (peripheral artery disease)    3. Gastroesophageal reflux disease, unspecified whether esophagitis present    4. Hyperlipidemia, unspecified hyperlipidemia type    5. Vitamin D deficiency    6. Type 2 diabetes mellitus with diabetic nephropathy, without long-term current use of insulin      Htn controlled low salt diet emphasized continue same    Diabetes uncontrolled counseled about diet and meds intake continue plan per pcp.   Pvd asymptomatic normal triphasic waveforms will get him to take asa ec 81 mg po daily.   Has normal capillary refill.his ldl is on target has no claudication reassure continue rf modification.  Reviewed arterial study myself agree with official interpretation.  Plan:   Continue current therapy  Cardiac low salt diet.  Risk factor modification and excercise  program.  F/u prn   As ec 81 mg po daily         [1]   Social History  Tobacco Use    Smoking status: Former     Passive exposure: Past    Smokeless tobacco: Never   Substance Use Topics    Alcohol use: Not Currently    Drug use: Never

## 2025-06-05 ENCOUNTER — PATIENT MESSAGE (OUTPATIENT)
Dept: PRIMARY CARE CLINIC | Facility: CLINIC | Age: 85
End: 2025-06-05
Payer: MEDICARE

## 2025-06-12 ENCOUNTER — OFFICE VISIT (OUTPATIENT)
Dept: PRIMARY CARE CLINIC | Facility: CLINIC | Age: 85
End: 2025-06-12
Payer: MEDICARE

## 2025-06-12 ENCOUNTER — HOSPITAL ENCOUNTER (OUTPATIENT)
Dept: PREADMISSION TESTING | Facility: HOSPITAL | Age: 85
Discharge: HOME | End: 2025-06-12
Attending: UROLOGY
Payer: MEDICARE

## 2025-06-12 VITALS
DIASTOLIC BLOOD PRESSURE: 52 MMHG | HEIGHT: 68 IN | HEART RATE: 74 BPM | BODY MASS INDEX: 23.15 KG/M2 | SYSTOLIC BLOOD PRESSURE: 94 MMHG | WEIGHT: 152.75 LBS

## 2025-06-12 DIAGNOSIS — K92.1 MELENA: ICD-10-CM

## 2025-06-12 DIAGNOSIS — R10.13 EPIGASTRIC PAIN: Primary | ICD-10-CM

## 2025-06-12 DIAGNOSIS — K59.00 CONSTIPATION, UNSPECIFIED CONSTIPATION TYPE: ICD-10-CM

## 2025-06-12 DIAGNOSIS — C61 PROSTATE CANCER: ICD-10-CM

## 2025-06-12 DIAGNOSIS — R10.13 EPIGASTRIC PAIN: ICD-10-CM

## 2025-06-12 DIAGNOSIS — I95.9 HYPOTENSION, UNSPECIFIED HYPOTENSION TYPE: ICD-10-CM

## 2025-06-12 PROCEDURE — 3078F DIAST BP <80 MM HG: CPT | Mod: CPTII,S$GLB,, | Performed by: PHYSICIAN ASSISTANT

## 2025-06-12 PROCEDURE — 3074F SYST BP LT 130 MM HG: CPT | Mod: CPTII,S$GLB,, | Performed by: PHYSICIAN ASSISTANT

## 2025-06-12 PROCEDURE — G2211 COMPLEX E/M VISIT ADD ON: HCPCS | Mod: S$GLB,,, | Performed by: PHYSICIAN ASSISTANT

## 2025-06-12 PROCEDURE — 99999 PR PBB SHADOW E&M-EST. PATIENT-LVL III: CPT | Mod: PBBFAC,,, | Performed by: PHYSICIAN ASSISTANT

## 2025-06-12 PROCEDURE — 1126F AMNT PAIN NOTED NONE PRSNT: CPT | Mod: CPTII,S$GLB,, | Performed by: PHYSICIAN ASSISTANT

## 2025-06-12 PROCEDURE — 1159F MED LIST DOCD IN RCRD: CPT | Mod: CPTII,S$GLB,, | Performed by: PHYSICIAN ASSISTANT

## 2025-06-12 PROCEDURE — 99215 OFFICE O/P EST HI 40 MIN: CPT | Mod: S$GLB,,, | Performed by: PHYSICIAN ASSISTANT

## 2025-06-12 PROCEDURE — 3288F FALL RISK ASSESSMENT DOCD: CPT | Mod: CPTII,S$GLB,, | Performed by: PHYSICIAN ASSISTANT

## 2025-06-12 PROCEDURE — 1101F PT FALLS ASSESS-DOCD LE1/YR: CPT | Mod: CPTII,S$GLB,, | Performed by: PHYSICIAN ASSISTANT

## 2025-06-12 RX ORDER — PANTOPRAZOLE SODIUM 40 MG/1
40 TABLET, DELAYED RELEASE ORAL 2 TIMES DAILY
Qty: 180 TABLET | Refills: 0 | Status: SHIPPED | OUTPATIENT
Start: 2025-06-12

## 2025-06-12 RX ORDER — SUCRALFATE 1 G/10ML
1 SUSPENSION ORAL
Qty: 400 ML | Refills: 1 | Status: SHIPPED | OUTPATIENT
Start: 2025-06-12

## 2025-06-13 ENCOUNTER — RESULTS FOLLOW-UP (OUTPATIENT)
Dept: PRIMARY CARE CLINIC | Facility: CLINIC | Age: 85
End: 2025-06-13

## 2025-06-14 ENCOUNTER — PATIENT MESSAGE (OUTPATIENT)
Dept: PRIMARY CARE CLINIC | Facility: CLINIC | Age: 85
End: 2025-06-14
Payer: MEDICARE

## 2025-06-15 ENCOUNTER — PATIENT MESSAGE (OUTPATIENT)
Dept: PRIMARY CARE CLINIC | Facility: CLINIC | Age: 85
End: 2025-06-15
Payer: MEDICARE

## 2025-06-16 ENCOUNTER — PATIENT MESSAGE (OUTPATIENT)
Dept: UROLOGY | Facility: CLINIC | Age: 85
End: 2025-06-16
Payer: MEDICARE

## 2025-06-16 DIAGNOSIS — C61 PROSTATE CANCER: Primary | ICD-10-CM

## 2025-06-16 RX ORDER — TAMSULOSIN HYDROCHLORIDE 0.4 MG/1
0.8 CAPSULE ORAL DAILY
Qty: 60 CAPSULE | Refills: 6 | Status: SHIPPED | OUTPATIENT
Start: 2025-06-16 | End: 2026-06-16

## 2025-06-26 NOTE — PROGRESS NOTES
"    [facility]       [address] [place] [address]      Phone: [phone]      Fax: [phone]    Enid Gil PA-C                Office Visit  06/26/2025        Subjective      HPI:  Brock Camara is a 85 y.o. male presents today in clinic for "Abdominal Pain and Constipation  ."     CHIEF COMPLAINT:  Patient presents with complaints of burning abdominal pain and abnormal bowel movements.  Patient is a accompanied by his daughter in clinic today.  Who provides portions of the history    HPI:  Patient reports burning pain in his abdomen that started approximately 2 weeks ago. The pain is localized to the right side of his abdomen, does not radiate, and is intermittent. It worsens after eating certain foods and can be severe at times. One morning, the pain was so intense that he had difficulty speaking. Over-the-counter medications like Mylanta have not provided relief.    He had severe constipation 2 weeks ago, causing anal tearing. He was given Colace and started on Miralax twice daily, later reduced to daily as his condition improved. Following the resolution of constipation, the stomach issues began.    His bowel movements have been irregular. In the past couple of days, he has had abnormal stools described as loose but not entirely liquid. He occasionally notices particles of black blood in his hard stools.    He was evaluated at an after-hours clinic the night before this appointment due to severe pain after dinner. An H. pylori test was performed and came back negative. The clinician recommended that he go to the emergency room or seek urgent care for a CT and additional labs, but he opted to come to this appointment instead.    He started taking Mounjaro (a very low dose) in March, approximately 4-5 months ago. The constipation began after starting Mounjaro. He also reports frequent belching.    He denies nausea, vomiting, being woken up at night " by the pain, feeling lightheaded, dizzy, or weak.      Abdominal Pain  Associated symptoms include constipation. Pertinent negatives include no arthralgias, diarrhea, headaches, myalgias, nausea or vomiting.   Constipation  Associated symptoms include abdominal pain. Pertinent negatives include no diarrhea, difficulty urinating, nausea, rectal pain or vomiting.             The following were updated and reviewed by myself in the chart: medications, past medical history, past surgical history, family history, social history, and allergies.     Medications:  Medications Ordered Prior to Encounter[1]     PMHx:  Past Medical History:   Diagnosis Date    Diabetes mellitus, type 2     Hyperlipidemia       Patient Active Problem List    Diagnosis Date Noted    Type 2 diabetes mellitus, without long-term current use of insulin 12/30/2024    Stage 3a chronic kidney disease 10/05/2024    Erectile dysfunction following radiation therapy 04/25/2023    Prostate cancer 10/05/2021    Dysuria 10/05/2021    Essential hypertension 05/14/2020    Gastroesophageal reflux disease 06/08/2018    Vitamin D deficiency 06/08/2018    Diabetes mellitus 06/08/2018    Hyperlipidemia 03/08/2017        PSHx:  Past Surgical History:   Procedure Laterality Date    SHOULDER SURGERY      when he was 25 years old        FHx:  No family history on file.     Social:  Social History     Socioeconomic History    Marital status: Single   Tobacco Use    Smoking status: Former     Passive exposure: Past    Smokeless tobacco: Never   Substance and Sexual Activity    Alcohol use: Not Currently    Drug use: Never    Sexual activity: Yes     Partners: Female     Social Drivers of Health     Financial Resource Strain: Low Risk  (10/22/2024)    Overall Financial Resource Strain (CARDIA)     Difficulty of Paying Living Expenses: Not hard at all   Food Insecurity: No Food Insecurity (10/22/2024)    Hunger Vital Sign     Worried About Running Out of Food in the Last  "Year: Never true     Ran Out of Food in the Last Year: Never true   Transportation Needs: No Transportation Needs (10/5/2024)    Received from [facility]    PRAFRENCHE - Transportation     Lack of Transportation (Medical): No     Lack of Transportation (Non-Medical): No   Physical Activity: Insufficiently Active (10/22/2024)    Exercise Vital Sign     Days of Exercise per Week: 5 days     Minutes of Exercise per Session: 20 min   Stress: No Stress Concern Present (10/22/2024)    [employer] - Occupational Stress Questionnaire     Feeling of Stress : Only a little   Housing Stability: Unknown (10/22/2024)    Housing Stability Vital Sign     Unable to Pay for Housing in the Last Year: No        Allergies:  Review of patient's allergies indicates:   Allergen Reactions    Adhesive      Pt states allergic to "Paper Tape" = rash        ROS:  Review of Systems   Constitutional:  Negative for activity change, appetite change and unexpected weight change.   HENT:  Negative for hearing loss and trouble swallowing.    Eyes:  Negative for visual disturbance.   Respiratory:  Negative for shortness of breath.    Cardiovascular:  Negative for chest pain.   Gastrointestinal:  Positive for abdominal pain and constipation. Negative for blood in stool, diarrhea, nausea, rectal pain and vomiting.   Genitourinary:  Negative for difficulty urinating.   Musculoskeletal:  Negative for arthralgias and myalgias.   Skin:  Negative for rash.   Neurological:  Negative for dizziness, tremors, seizures, speech difficulty, weakness, light-headedness, numbness and headaches.   Psychiatric/Behavioral:  Negative for sleep disturbance. The patient is not nervous/anxious.           Objective      BP (!) 94/52   Pulse 74   Ht 5' 8" (1.727 m)   Wt 69.3 kg (152 lb 12.5 oz)   BMI 23.23 kg/m²   Ht Readings from Last 3 Encounters:   06/12/25 5' 8" (1.727 " "m)   03/31/25 5' 8" (1.727 m)   03/07/25 5' 8" (1.727 m)     Wt Readings from Last 3 Encounters:   06/12/25 69.3 kg (152 lb 12.5 oz)   04/02/25 71 kg (156 lb 8.4 oz)   03/31/25 69.2 kg (152 lb 8.9 oz)       PHYSICAL EXAM:  Physical Exam  Constitutional:       General: He is not in acute distress.     Appearance: Normal appearance. He is not ill-appearing.   HENT:      Head: Normocephalic and atraumatic.      Nose: Nose normal.      Mouth/Throat:      Mouth: Mucous membranes are moist.   Eyes:      Extraocular Movements: Extraocular movements intact.      Pupils: Pupils are equal, round, and reactive to light.   Cardiovascular:      Rate and Rhythm: Normal rate and regular rhythm.      Pulses: Normal pulses.      Heart sounds: Normal heart sounds.   Pulmonary:      Effort: Pulmonary effort is normal. No respiratory distress.      Breath sounds: Normal breath sounds.   Abdominal:      General: Abdomen is flat. Bowel sounds are normal. There is no distension.      Palpations: There is no mass.      Tenderness: There is no abdominal tenderness. There is no guarding or rebound.      Hernia: No hernia is present.   Musculoskeletal:         General: Normal range of motion.      Cervical back: Normal range of motion.      Right lower leg: No edema.      Left lower leg: No edema.   Skin:     General: Skin is warm and dry.      Findings: No rash.   Neurological:      General: No focal deficit present.      Mental Status: He is alert and oriented to person, place, and time.   Psychiatric:         Mood and Affect: Mood normal.         Thought Content: Thought content normal.            Assessment    1. Epigastric pain    2. Constipation, unspecified constipation type    3. Hypotension, unspecified hypotension type    4. Melena          Moy Gutiérrez was seen today for abdominal pain and constipation.    Diagnoses and all orders for this visit:    Epigastric pain  -     CBC W/ AUTO DIFFERENTIAL; Future  -     Comprehensive " Metabolic Panel; Future  -     Lipase; Future  -     Urinalysis; Future  -     Ambulatory referral/consult to Endo Procedure ; Future  -     Ambulatory referral/consult to Gastroenterology; Future    Constipation, unspecified constipation type  -     Ambulatory referral/consult to Gastroenterology; Future    Hypotension, unspecified hypotension type    Melena  -     Ambulatory referral/consult to Endo Procedure ; Future  -     Ambulatory referral/consult to Gastroenterology; Future    Other orders  -     pantoprazole (PROTONIX) 40 MG tablet; Take 1 tablet (40 mg total) by mouth 2 (two) times daily.  -     sucralfate (CARAFATE) 100 mg/mL suspension; Take 10 mLs (1 g total) by mouth 4 (four) times daily before meals and nightly.    PROSTATE CANCER:   Patient had prostate cancer tumor removed and underwent radiation therapy.       Noted that the patient reports burning in the stomach ongoing for a few weeks, worse after eating certain foods, and has had an ulcer in the past.   H. pylori test was negative, indicating the ulcer is not caused by H. pylori.   Explained that H. pylori is just one type of bacteria that can cause ulcers, and ulcers can occur without H. pylori.   Discussed potential complications of untreated ulcers, including perforation.    MELENA:   Noted that the patient sometimes has black tarry stools, indicating melena, which may be related to the gastric ulcer.    EPIGASTRIC PAIN:   Documented burning abdominal pain ongoing for a few weeks, worse after eating certain foods.   Pain is not present at the time of the visit, comes and goes periodically, and does not wake the patient at night.   Considered multiple potential causes for abdominal pain, including gastric ulcer, pancreatitis, and gallbladder issues.    Labs ordered.  Discussed with Dr. Kong - patient needs to scope both upper and lower endoscopy.  We will also refer to Dr. Silveira, GI.   Prescribed protonix and carafate.    Dietary modifications.     DRUG-INDUCED CONSTIPATION:   Noted that the patient experienced constipation after starting Mounjaro, a common side effect of the medication.   Constipation has improved with more regular bowel movements, with last bowel movement occurring last night.   Prescribed doses of Colace and initiated Miralax twice daily, then reduced to daily as condition improves.    HISTORY OF CIRCULATORY SYSTEM DISEASE:   Noted that the patient had hypertension prior to a fall, now has hypotension.   BP is low but appears stable overall.    FOLLOW-UP:  Follow up if symptoms worsen or fail to improve, for ED precautions .    I spent a total of 40 minutes face to face and non-face to face on the date of this visit.This includes time preparing to see the patient (eg, review of tests, notes), obtaining and/or reviewing additional history from an independent historian and/or outside medical records, documenting clinical information in the electronic health record, independently interpreting results and/or communicating results to the patient/family/caregiver, or care coordinator.  Visit today included increased complexity associated with the care of the episodic problem addressed and managing the longitudinal care of the patient due to the serious and/or complex managed problem(s).      Signed by:        Enid Gil PA-C      This note was generated with the assistance of ambient listening technology. Verbal consent was obtained by the patient and accompanying visitor(s) for the recording of patient appointment to facilitate this note. I attest to having reviewed and edited the generated note for accuracy, though some syntax or spelling errors may persist. Please contact the author of this note for any clarification.         [1]   Current Outpatient Medications on File Prior to Visit   Medication Sig Dispense Refill    empagliflozin (JARDIANCE) 10 mg tablet Take 1 tablet (10 mg total) by mouth once daily. 90  tablet 3    ketoconazole (NIZORAL) 2 % cream Apply topically 2 (two) times daily. To affected area on foot and toes 15 g 0    loratadine (CLARITIN) 10 mg tablet Take 1 tablet (10 mg total) by mouth once daily. 90 tablet 3    multivitamin (ONE DAILY MULTIVITAMIN) per tablet Take 1 tablet by mouth once daily.      tirzepatide (MOUNJARO) 2.5 mg/0.5 mL PnIj Inject 2.5 mg into the skin every 7 days. 2 mL 12    walker (ULTRA-LIGHT ROLLATOR) Misc Please dispense one ultralight Rollator, upright, walker to be used for ambulation. 1 each 0     No current facility-administered medications on file prior to visit.

## 2025-06-30 ENCOUNTER — OFFICE VISIT (OUTPATIENT)
Dept: PRIMARY CARE CLINIC | Facility: CLINIC | Age: 85
End: 2025-06-30
Payer: MEDICARE

## 2025-06-30 VITALS
HEIGHT: 68 IN | OXYGEN SATURATION: 97 % | WEIGHT: 149.5 LBS | SYSTOLIC BLOOD PRESSURE: 98 MMHG | TEMPERATURE: 97 F | BODY MASS INDEX: 22.66 KG/M2 | HEART RATE: 78 BPM | DIASTOLIC BLOOD PRESSURE: 50 MMHG

## 2025-06-30 DIAGNOSIS — L03.031 PARONYCHIA OF GREAT TOE OF RIGHT FOOT: ICD-10-CM

## 2025-06-30 DIAGNOSIS — E87.6 HYPOKALEMIA: ICD-10-CM

## 2025-06-30 DIAGNOSIS — I10 PRIMARY HYPERTENSION: ICD-10-CM

## 2025-06-30 DIAGNOSIS — E11.69 TYPE 2 DIABETES MELLITUS WITH OTHER SPECIFIED COMPLICATION, WITHOUT LONG-TERM CURRENT USE OF INSULIN: Primary | ICD-10-CM

## 2025-06-30 DIAGNOSIS — G47.9 SLEEP DIFFICULTIES: ICD-10-CM

## 2025-06-30 DIAGNOSIS — F41.9 ANXIETY: ICD-10-CM

## 2025-06-30 DIAGNOSIS — K21.00 GASTROESOPHAGEAL REFLUX DISEASE WITH ESOPHAGITIS, UNSPECIFIED WHETHER HEMORRHAGE: ICD-10-CM

## 2025-06-30 DIAGNOSIS — E78.5 HYPERLIPIDEMIA, UNSPECIFIED HYPERLIPIDEMIA TYPE: ICD-10-CM

## 2025-06-30 PROBLEM — N18.31 STAGE 3A CHRONIC KIDNEY DISEASE: Status: RESOLVED | Noted: 2024-10-05 | Resolved: 2025-06-30

## 2025-06-30 PROCEDURE — 1159F MED LIST DOCD IN RCRD: CPT | Mod: CPTII,S$GLB,, | Performed by: FAMILY MEDICINE

## 2025-06-30 PROCEDURE — 1101F PT FALLS ASSESS-DOCD LE1/YR: CPT | Mod: CPTII,S$GLB,, | Performed by: FAMILY MEDICINE

## 2025-06-30 PROCEDURE — 99215 OFFICE O/P EST HI 40 MIN: CPT | Mod: S$GLB,,, | Performed by: FAMILY MEDICINE

## 2025-06-30 PROCEDURE — 1126F AMNT PAIN NOTED NONE PRSNT: CPT | Mod: CPTII,S$GLB,, | Performed by: FAMILY MEDICINE

## 2025-06-30 PROCEDURE — 3074F SYST BP LT 130 MM HG: CPT | Mod: CPTII,S$GLB,, | Performed by: FAMILY MEDICINE

## 2025-06-30 PROCEDURE — 3078F DIAST BP <80 MM HG: CPT | Mod: CPTII,S$GLB,, | Performed by: FAMILY MEDICINE

## 2025-06-30 PROCEDURE — 3288F FALL RISK ASSESSMENT DOCD: CPT | Mod: CPTII,S$GLB,, | Performed by: FAMILY MEDICINE

## 2025-06-30 PROCEDURE — G2211 COMPLEX E/M VISIT ADD ON: HCPCS | Mod: S$GLB,,, | Performed by: FAMILY MEDICINE

## 2025-06-30 PROCEDURE — 99999 PR PBB SHADOW E&M-EST. PATIENT-LVL IV: CPT | Mod: PBBFAC,,, | Performed by: FAMILY MEDICINE

## 2025-06-30 RX ORDER — TIRZEPATIDE 2.5 MG/.5ML
2.5 INJECTION, SOLUTION SUBCUTANEOUS
Qty: 2 ML | Refills: 12 | Status: SHIPPED | OUTPATIENT
Start: 2025-06-30

## 2025-06-30 RX ORDER — FAMOTIDINE 40 MG/1
40 TABLET, FILM COATED ORAL DAILY PRN
Qty: 90 TABLET | Refills: 3 | Status: SHIPPED | OUTPATIENT
Start: 2025-06-30 | End: 2026-06-30

## 2025-06-30 RX ORDER — TRAZODONE HYDROCHLORIDE 50 MG/1
50 TABLET ORAL NIGHTLY
Qty: 90 TABLET | Refills: 3 | Status: SHIPPED | OUTPATIENT
Start: 2025-06-30 | End: 2026-06-30

## 2025-06-30 RX ORDER — PANTOPRAZOLE SODIUM 40 MG/1
40 TABLET, DELAYED RELEASE ORAL 2 TIMES DAILY
Qty: 180 TABLET | Refills: 0 | Status: SHIPPED | OUTPATIENT
Start: 2025-06-30

## 2025-06-30 RX ORDER — SULFAMETHOXAZOLE AND TRIMETHOPRIM 800; 160 MG/1; MG/1
1 TABLET ORAL 2 TIMES DAILY
Qty: 20 TABLET | Refills: 0 | Status: SHIPPED | OUTPATIENT
Start: 2025-06-30 | End: 2025-07-10

## 2025-06-30 RX ORDER — SERTRALINE HYDROCHLORIDE 25 MG/1
25 TABLET, FILM COATED ORAL DAILY
Qty: 90 TABLET | Refills: 3 | Status: SHIPPED | OUTPATIENT
Start: 2025-06-30 | End: 2026-06-30

## 2025-06-30 RX ORDER — MUPIROCIN 20 MG/G
OINTMENT TOPICAL 2 TIMES DAILY
Qty: 30 G | Refills: 1 | Status: SHIPPED | OUTPATIENT
Start: 2025-06-30 | End: 2025-07-10

## 2025-06-30 NOTE — PATIENT INSTRUCTIONS
Overall, everything looks pretty good today.      For the toe, try soaking it in some warm water with Epsom salts for about 20-30 minutes, 2 times per day.  After soaking, clean it gently with Dial soap and water.  Rinse thoroughly, pat dry.  Then, apply some Bactroban (mupirocin) ointment around the edges of the nail.  This should help with healing from the outside.      Take my Bactrim (trimethoprim/sulfamethoxazole) twice daily for the next 10 days.  This should help kill the bacteria from the inside.    You have an appointment with the podiatrist on July 9.  Please keep this appointment.  If there is any infection left, he will open it up to drain it.      Sugars doing much better since last visit! A1c has gone from 7.7%, down to 7.0%.  I think the Mounjaro/Jardiance combination is working well.  Let us not change anything.  We can recheck your A1c in about three or four months and make adjustments at that time.      Keep your appointment with GI in a couple of weeks.  In the meantime, stop the Carafate.  Keep taking the Protonix twice a day.  I am also sending a prescription for famotidine to the pharmacy.  You can use this mid day if you get heartburn/reflux.    For the anxiety, worry, etc., let us try starting on some Zoloft once daily each morning.  At bedtime, try taking my trazodone.  This should help get you a good night's sleep.    Continue to eat a healthy diet.  Be careful with portion sizes.  Includes lots of fresh fruits, vegetables, whole grains, lean proteins.  See info below.    Keep hydrated.  Be sure to drink at least 8-10, 8 oz, glasses of water every day.    Stay active.  Try to do some sort of physical activity every day.  Nothing outrageous, just try walking for 10-15 minutes each day.

## 2025-06-30 NOTE — PROGRESS NOTES
"    [facility]       [address] [place] [address]      Phone: [phone]      Fax: [phone]    Adam Kong MD                Office Visit  06/30/2025        Subjective      HPI:  Brock Camara is a 85 y.o. male presents today in clinic for "Follow-up (3 month)  ."     85-year-old gentleman presents today to discuss a couple of issues.    His son, Brock, and daughter-in-law, Halle, are present with him.  They provide most of the history.      Patient states he feels fine today.  No chest pain, shortness on breath.  No fever, chills, body aches.  No coughing, sneezing, URI type symptoms.  Appetite normal.  No urinary issues.    Has been having some burning in his stomach.  Tried taking Carafate, that made it worse.  Started developing some sores in the back of his throat.  Stop the Carafate.  Still taking Protonix daily.  Has a appointment with GI in a couple of weeks.    Right big toe has been red, swollen.  Leaking foul-smelling, yellowish fluid.  No pain.  Has a appointment with Podiatry next week.    Movements has been a bit mixed lately.  Every week or so, has been getting some constipation.  Using MiraLax, which seems to be helping.    Has diabetes.  Previous A1c was 7.7%.  Blood sugars were running about 160 at home.  Took Glucophage in the past, but that was discontinued because he would have hypoglycemic events.  Last visit, we stopped his metformin.  Lower Jardiance down to 10 mg daily.  Started him on Mounjaro 2.5 mg weekly.  Tolerating these well.  A1c has come down to 7%.    Has HTN.  Blood pressure tends to fluctuate, but generally in a good range.  Not currently on medication.      Has HLD.  Takes simvastatin 40 mg daily.  No issues with this medication.    Has some occasional issues with allergies.  Uses Claritin.    Son, daughter-in-law, state that he has been having some emotional issues lately.  He has always been a cautious " person, but lately he just thinks and things and thinks.  They thought gets in his head and ruminates.  Always worry.  Ordering on paranoia.  He wakes up worried.  Occasionally gets some outbursts, anxiety, aggression.  Thinks he should be on some kind of medicine to help with it.  All of this also affects his sleep.  He is often up until very late at night.    PMH: Dm.  HTN.  HLD.  Chronic rhinitis.  Prostate cancer/radiation.  PSH: AC separation.  Tumor of buttock.  Allergy:  Adhesive tape.    Social: Retired.  Previously worked as a .  .  Lives with son, daughter-in-law.    T: Denies current use.  Quit in 1983.    A: Rarely   D: Denies    Exercise: Tries to walk when he can.  Was doing physical therapy at home.      Urology: Dr. Masters        The following were updated and reviewed by myself in the chart: medications, past medical history, past surgical history, family history, social history, and allergies.     Medications:  Medications Ordered Prior to Encounter[1]     PMHx:  Past Medical History:   Diagnosis Date    Diabetes mellitus, type 2     Hyperlipidemia       Patient Active Problem List    Diagnosis Date Noted    Type 2 diabetes mellitus, without long-term current use of insulin 12/30/2024    Erectile dysfunction following radiation therapy 04/25/2023    Prostate cancer 10/05/2021    Dysuria 10/05/2021    Essential hypertension 05/14/2020    Gastroesophageal reflux disease 06/08/2018    Vitamin D deficiency 06/08/2018    Diabetes mellitus 06/08/2018    Hyperlipidemia 03/08/2017        PSHx:  Past Surgical History:   Procedure Laterality Date    SHOULDER SURGERY      when he was 25 years old        FHx:  No family history on file.     Social:  Social History     Socioeconomic History    Marital status: Single   Tobacco Use    Smoking status: Former     Passive exposure: Past    Smokeless tobacco: Never   Substance and Sexual Activity    Alcohol use: Not Currently     "Drug use: Never    Sexual activity: Yes     Partners: Female     Social Drivers of Health     Financial Resource Strain: Low Risk  (10/22/2024)    Overall Financial Resource Strain (CARDIA)     Difficulty of Paying Living Expenses: Not hard at all   Food Insecurity: No Food Insecurity (10/22/2024)    Hunger Vital Sign     Worried About Running Out of Food in the Last Year: Never true     Ran Out of Food in the Last Year: Never true   Transportation Needs: No Transportation Needs (10/5/2024)    Received from [facility]    [facility]     Lack of Transportation (Medical): No     Lack of Transportation (Non-Medical): No   Physical Activity: Insufficiently Active (10/22/2024)    Exercise Vital Sign     Days of Exercise per Week: 5 days     Minutes of Exercise per Session: 20 min   Stress: No Stress Concern Present (10/22/2024)    [employer] - Occupational Stress Questionnaire     Feeling of Stress : Only a little   Housing Stability: Unknown (10/22/2024)    Housing Stability Vital Sign     Unable to Pay for Housing in the Last Year: No        Allergies:  Review of patient's allergies indicates:   Allergen Reactions    Adhesive      Pt states allergic to "Paper Tape" = rash        ROS:  Review of Systems   Constitutional:  Negative for activity change, appetite change, chills and fever.   HENT:  Negative for congestion, postnasal drip, rhinorrhea, sore throat and trouble swallowing.    Respiratory:  Negative for cough and shortness of breath.    Cardiovascular:  Negative for chest pain and palpitations.   Gastrointestinal:  Negative for abdominal pain, constipation, diarrhea, nausea and vomiting.   Genitourinary:  Negative for difficulty urinating.   Musculoskeletal:  Negative for arthralgias and myalgias.   Skin:  Positive for color change and wound. Negative for rash.   Neurological:  Negative for " "headaches.   All other systems reviewed and are negative.         Objective      BP (!) 98/50 (BP Location: Right arm, Patient Position: Sitting)   Pulse 78   Temp 97.3 °F (36.3 °C) (Tympanic)   Ht 5' 8" (1.727 m)   Wt 67.8 kg (149 lb 7.6 oz)   SpO2 97%   BMI 22.73 kg/m²   Ht Readings from Last 3 Encounters:   06/30/25 5' 8" (1.727 m)   06/12/25 5' 8" (1.727 m)   03/31/25 5' 8" (1.727 m)     Wt Readings from Last 3 Encounters:   06/30/25 67.8 kg (149 lb 7.6 oz)   06/12/25 69.3 kg (152 lb 12.5 oz)   04/02/25 71 kg (156 lb 8.4 oz)       PHYSICAL EXAM:  Physical Exam  Vitals and nursing note reviewed.   Constitutional:       General: He is not in acute distress.     Appearance: Normal appearance.   HENT:      Head: Normocephalic and atraumatic.      Right Ear: Tympanic membrane, ear canal and external ear normal.      Left Ear: Tympanic membrane, ear canal and external ear normal.      Nose: Nose normal. No congestion or rhinorrhea.      Mouth/Throat:      Mouth: Mucous membranes are moist.      Pharynx: Oropharynx is clear. No oropharyngeal exudate or posterior oropharyngeal erythema.   Eyes:      Extraocular Movements: Extraocular movements intact.      Conjunctiva/sclera: Conjunctivae normal.      Pupils: Pupils are equal, round, and reactive to light.   Cardiovascular:      Rate and Rhythm: Normal rate and regular rhythm.   Pulmonary:      Effort: Pulmonary effort is normal.      Breath sounds: No wheezing, rhonchi or rales.   Musculoskeletal:         General: Normal range of motion.      Cervical back: Normal range of motion.   Feet:      Right foot:      Toenail Condition: Right toenails are abnormally thick and ingrown. Fungal disease present.  Lymphadenopathy:      Cervical: No cervical adenopathy.   Skin:     General: Skin is warm and dry.   Neurological:      General: No focal deficit present.      Mental Status: He is alert.              LABS / IMAGING:  Recent Results (from the past 26 weeks) "   SCHEDULED EKG 12-LEAD (to Muse)    Collection Time: 04/02/25 11:16 AM   Result Value Ref Range    QRS Duration 124 ms    [facility] QTC Calculation 438 ms   PROSTATE SPECIFIC ANTIGEN, DIAGNOSTIC    Collection Time: 06/12/25  2:15 PM   Result Value Ref Range    Prostate Specific Antigen 0.03 <=4.00 ng/mL   Hemoglobin A1C    Collection Time: 06/12/25  2:15 PM   Result Value Ref Range    Hemoglobin A1c 7.0 (H) 4.0 - 5.6 %    Estimated Average Glucose 154 (H) 68 - 131 mg/dL   Comprehensive Metabolic Panel    Collection Time: 06/12/25  2:15 PM   Result Value Ref Range    Sodium 138 136 - 145 mmol/L    Potassium 5.0 3.5 - 5.1 mmol/L    Chloride 103 95 - 110 mmol/L    CO2 28 23 - 29 mmol/L    Glucose 157 (H) 70 - 110 mg/dL    BUN 19 8 - 23 mg/dL    Creatinine 1.0 0.5 - 1.4 mg/dL    Calcium 9.5 8.7 - 10.5 mg/dL    Protein Total 6.5 6.0 - 8.4 gm/dL    Albumin 3.9 3.5 - 5.2 g/dL    Bilirubin Total 0.6 0.1 - 1.0 mg/dL    ALP 80 40 - 150 unit/L    AST 18 11 - 45 unit/L    ALT 26 10 - 44 unit/L    Anion Gap 7 (L) 8 - 16 mmol/L    eGFR >60 >60 mL/min/1.73/m2   Lipase    Collection Time: 06/12/25  2:15 PM   Result Value Ref Range    Lipase Level 33 4 - 60 U/L   Urinalysis    Collection Time: 06/12/25  2:15 PM   Result Value Ref Range    Color, UA Yellow Straw, Nini, Yellow, Light-Orange    Appearance, UA Clear Clear    pH, UA 6.0 5.0 - 8.0    Spec Grav UA >=1.030 (A) 1.005 - 1.030    Protein, UA Negative Negative    Glucose, UA 4+ (A) Negative    Ketones, UA Negative Negative    Bilirubin, UA Negative Negative    Blood, UA Negative Negative    Nitrites, UA Negative Negative    Urobilinogen, UA Negative <2.0 EU/dL    Leukocyte Esterase, UA Negative Negative   CBC with Differential    Collection Time: 06/12/25  2:15 PM   Result Value Ref Range    WBC 7.57 3.90 - 12.70 K/uL    RBC 4.38 (L) 4.60 - 6.20 M/uL    HGB 13.2 (L) 14.0 - 18.0 gm/dL    HCT 41.3 40.0 - 54.0 %    MCV 94 82 - 98 fL    MCH 30.1 27.0 - 31.0 pg    MCHC 32.0 32.0 - 36.0  g/dL    RDW 13.5 11.5 - 14.5 %    Platelet Count 244 150 - 450 K/uL    MPV 11.1 9.2 - 12.9 fL    Nucleated RBC 0 <=0 /100 WBC    Neut % 68.9 38 - 73 %    Lymph % 18.2 18 - 48 %    Mono % 7.7 4 - 15 %    Eos % 4.2 <=8 %    Basophil % 0.7 <=1.9 %    Imm Grans % 0.3 0.0 - 0.5 %    Neut # 5.22 1.8 - 7.7 K/uL    Lymph # 1.38 1 - 4.8 K/uL    Mono # 0.58 0.3 - 1 K/uL    Eos # 0.32 <=0.5 K/uL    Baso # 0.05 <=0.2 K/uL    Imm Grans # 0.02 0.00 - 0.04 K/uL   Urinalysis Microscopic    Collection Time: 06/12/25  2:15 PM   Result Value Ref Range    RBC, UA <1 0 - 4 /HPF    WBC, UA <1 0 - 5 /HPF    Bacteria, UA None None, Rare, Occasional /HPF    Yeast, UA None None /HPF    Hyaline Casts, UA 0 0 - 1 /LPF    Microscopic Comment     LIPASE    Collection Time: 06/14/25 10:16 AM   Result Value Ref Range    Lipase 40 8 - 78 U/L         Assessment    1. Type 2 diabetes mellitus with other specified complication, without long-term current use of insulin    2. Gastroesophageal reflux disease with esophagitis, unspecified whether hemorrhage    3. Paronychia of great toe of right foot    4. Anxiety    5. Sleep difficulties    6. Primary hypertension    7. Hypokalemia    8. Hyperlipidemia, unspecified hyperlipidemia rosalind Gutiérrez was seen today for follow-up.    Diagnoses and all orders for this visit:    Type 2 diabetes mellitus with other specified complication, without long-term current use of insulin  -     empagliflozin (JARDIANCE) 10 mg tablet; Take 1 tablet (10 mg total) by mouth once daily.  -     tirzepatide (MOUNJARO) 2.5 mg/0.5 mL PnIj; Inject 2.5 mg into the skin every 7 days.  -     Hemoglobin A1C; Future  -     Microalbumin/Creatinine Ratio, Urine; Future    Gastroesophageal reflux disease with esophagitis, unspecified whether hemorrhage  -     pantoprazole (PROTONIX) 40 MG tablet; Take 1 tablet (40 mg total) by mouth 2 (two) times daily.  -     famotidine (PEPCID) 40 MG tablet; Take 1 tablet (40 mg total) by  mouth daily as needed for Heartburn.    Paronychia of great toe of right foot  -     sulfamethoxazole-trimethoprim 800-160mg (BACTRIM DS) 800-160 mg Tab; Take 1 tablet by mouth 2 (two) times daily. for 10 days  -     mupirocin (BACTROBAN) 2 % ointment; Apply topically 2 (two) times daily. for 10 days    Anxiety  -     sertraline (ZOLOFT) 25 MG tablet; Take 1 tablet (25 mg total) by mouth once daily.    Sleep difficulties  -     traZODone (DESYREL) 50 MG tablet; Take 1 tablet (50 mg total) by mouth every evening.    Primary hypertension  -     CBC Auto Differential; Future  -     Comprehensive Metabolic Panel; Future  -     TSH; Future  -     Lipid Panel; Future    Hypokalemia  -     Comprehensive Metabolic Panel; Future    Hyperlipidemia, unspecified hyperlipidemia type  -     Lipid Panel; Future    Physically, everything looks okay.      Right toe is either an ingrowing toenail or paronychia.  We will start him on some oral Bactrim, recommended soaking in Epson salts, clean with Dial soap, apply topical Bactroban.  Keep appointment with Podiatry next week.      Blood sugar doing much better.  Seems to be tolerating the Mounjaro, Jardiance okay.  A1c down to 7%.  We will stay on this regimen for the next four months.  At that time, we can adjust as needed.      Keep appointment with GI later this month.  In the meantime, continue Protonix twice a day.  Instead of Carafate, can use famotidine as needed.  Suspect that the Mounjaro maybe causing the constipation, some of the burning.  When he sees GI, hopefully they consider EGD, colon.    For the emotional issues, will have him start Zoloft 25 mg in the morning.  Trazodone at bedtime.      FOLLOW-UP:  Follow up in about 4 months (around 10/30/2025) for recheck, labs 1 week prior.    I spent a total of 40 minutes face to face and non-face to face on the date of this visit.This includes time preparing to see the patient (eg, review of tests, notes), obtaining and/or  reviewing additional history from an independent historian and/or outside medical records, documenting clinical information in the electronic health record, independently interpreting results and/or communicating results to the patient/family/caregiver, or care coordinator.  Visit today included increased complexity associated with the care of the episodic problem addressed and managing the longitudinal care of the patient due to the serious and/or complex managed problem(s).    Signed by:  Adam Kong MD       [1]   Current Outpatient Medications on File Prior to Visit   Medication Sig Dispense Refill    ketoconazole (NIZORAL) 2 % cream Apply topically 2 (two) times daily. To affected area on foot and toes 15 g 0    loratadine (CLARITIN) 10 mg tablet Take 1 tablet (10 mg total) by mouth once daily. 90 tablet 3    multivitamin (ONE DAILY MULTIVITAMIN) per tablet Take 1 tablet by mouth once daily.      tamsulosin (FLOMAX) 0.4 mg Cap Take 2 capsules (0.8 mg total) by mouth once daily. 60 capsule 6    walker (ULTRA-LIGHT ROLLATOR) Misc Please dispense one ultralight Rollator, upright, walker to be used for ambulation. 1 each 0    [DISCONTINUED] empagliflozin (JARDIANCE) 10 mg tablet Take 1 tablet (10 mg total) by mouth once daily. 90 tablet 3    [DISCONTINUED] pantoprazole (PROTONIX) 40 MG tablet Take 1 tablet (40 mg total) by mouth 2 (two) times daily. 180 tablet 0    [DISCONTINUED] sucralfate (CARAFATE) 100 mg/mL suspension Take 10 mLs (1 g total) by mouth 4 (four) times daily before meals and nightly. 400 mL 1    [DISCONTINUED] tirzepatide (MOUNJARO) 2.5 mg/0.5 mL PnIj Inject 2.5 mg into the skin every 7 days. 2 mL 12     No current facility-administered medications on file prior to visit.

## 2025-07-08 ENCOUNTER — TELEPHONE (OUTPATIENT)
Dept: PSYCHIATRY | Facility: CLINIC | Age: 85
End: 2025-07-08
Payer: MEDICARE

## 2025-07-08 ENCOUNTER — PATIENT MESSAGE (OUTPATIENT)
Dept: PRIMARY CARE CLINIC | Facility: CLINIC | Age: 85
End: 2025-07-08
Payer: MEDICARE

## 2025-07-08 NOTE — TELEPHONE ENCOUNTER
Copied from CRM #[id number]. Topic: General Inquiry - Patient Advice  >> Jul 8, 2025  8:07 AM Keshav wrote:  Type:  Needs Medical Advice    Who Called: Halle-Daughter  Would the patient rather a call back or a response via [facility]? Call back  Best Call Back Number: [phone]  Additional Information: she was calling because she was wondering does the Department take his insurance. She wanting to know before she gets a referral for him.

## 2025-07-13 ENCOUNTER — PATIENT MESSAGE (OUTPATIENT)
Dept: PRIMARY CARE CLINIC | Facility: CLINIC | Age: 85
End: 2025-07-13
Payer: MEDICARE

## 2025-07-14 ENCOUNTER — TELEMEDICINE (OUTPATIENT)
Dept: PRIMARY CARE CLINIC | Facility: CLINIC | Age: 85
End: 2025-07-14
Payer: MEDICARE

## 2025-07-14 DIAGNOSIS — Z74.1 NEED FOR ASSISTANCE WITH PERSONAL CARE: ICD-10-CM

## 2025-07-14 DIAGNOSIS — Z74.3 NEED FOR CONTINUOUS SUPERVISION: ICD-10-CM

## 2025-07-14 DIAGNOSIS — G91.1 OBSTRUCTIVE HYDROCEPHALUS: ICD-10-CM

## 2025-07-14 DIAGNOSIS — F22 PARANOIA: Primary | ICD-10-CM

## 2025-07-14 DIAGNOSIS — F22 DELUSIONAL DISORDER: ICD-10-CM

## 2025-07-14 PROCEDURE — G2211 COMPLEX E/M VISIT ADD ON: HCPCS | Mod: 95,,, | Performed by: PHYSICIAN ASSISTANT

## 2025-07-14 PROCEDURE — 98006 SYNCH AUDIO-VIDEO EST MOD 30: CPT | Mod: 95,,, | Performed by: PHYSICIAN ASSISTANT

## 2025-07-14 RX ORDER — OLANZAPINE 2.5 MG/1
TABLET, FILM COATED ORAL NIGHTLY
COMMUNITY
Start: 2025-07-10

## 2025-07-14 NOTE — PROGRESS NOTES
"    [facility]       [address] [place] [address]      Phone: [phone]      Fax: [phone]    Enid Gil PA-C                Video Visit  08/05/2025        Subjective      HPI:  Brock Camara is a 85 y.o. male presents today via video for "Hospital Follow Up (Dx psychosis /)  ."     CHIEF COMPLAINT:  Patient's daughter in law, Halle, presents to discuss her 85-year-old father's recent ER visit, ongoing psychiatric symptoms, and need for home care assistance.  Patients' son is also on the video visit.     HPI:  Patient, an 85-year-old male, recently visited the ER due to severe paranoia and requesting a firearm, prompting his daughter to call an ambulance. He was transferred to [facility] where a CT revealed enlarged ventricles, suggesting hydrocephalus.     He has had paranoid thoughts and behaviors for most of his life, which have recently worsened. He believes his family is attempting to harm him and has written notes claiming his daughter caused his demise. His paranoia is particularly pronounced when alone, with symptoms less apparent around others.    His condition has deteriorated to the point where he can no longer be trusted alone. He is unable to dress himself, shower, prepare meals independently, and requires assistance with toileting and other daily activities.    The night before discharge from the hospital, he was started on Zyprexa, an antipsychotic medication. Since returning home, his behavior has been variable, described as stable yesterday but waking up irritable today with fluctuating mood throughout the day.    Previous medications tried include Zoloft and trazodone, which were discontinued after 4-5 days of use as they appeared to worsen his condition.    He has an upcoming appointment with a podiatrist and a neurology follow-up scheduled for January.           The following were updated and reviewed by myself in the chart: " medications, past medical history, past surgical history, family history, social history, and allergies.     Medications:  Medications Ordered Prior to Encounter[1]     PMHx:  Past Medical History:   Diagnosis Date    Diabetes mellitus, type 2     Hyperlipidemia       Patient Active Problem List    Diagnosis Date Noted    Type 2 diabetes mellitus, without long-term current use of insulin 12/30/2024    Erectile dysfunction following radiation therapy 04/25/2023    Prostate cancer 10/05/2021    Dysuria 10/05/2021    Essential hypertension 05/14/2020    Gastroesophageal reflux disease 06/08/2018    Vitamin D deficiency 06/08/2018    Diabetes mellitus 06/08/2018    Hyperlipidemia 03/08/2017        PSHx:  Past Surgical History:   Procedure Laterality Date    SHOULDER SURGERY      when he was 25 years old        FHx:  No family history on file.     Social:  Social History     Socioeconomic History    Marital status: Single   Tobacco Use    Smoking status: Former     Passive exposure: Past    Smokeless tobacco: Never   Substance and Sexual Activity    Alcohol use: Not Currently    Drug use: Never    Sexual activity: Yes     Partners: Female     Social Drivers of Health     Financial Resource Strain: Low Risk  (10/22/2024)    Overall Financial Resource Strain (CARDIA)     Difficulty of Paying Living Expenses: Not hard at all   Food Insecurity: No Food Insecurity (10/22/2024)    Hunger Vital Sign     Worried About Running Out of Food in the Last Year: Never true     Ran Out of Food in the Last Year: Never true   Transportation Needs: No Transportation Needs (10/5/2024)    Received from [facility] Subsidiaries and Affiliates    PRAPARE - Transportation     Lack of Transportation (Medical): No     Lack of Transportation (Non-Medical): No   Physical Activity: Insufficiently Active (10/22/2024)    Exercise Vital Sign     Days of Exercise per Week: 5 days     Minutes of Exercise per  "Session: 20 min   Stress: No Stress Concern Present (10/22/2024)    [employer] - Occupational Stress Questionnaire     Feeling of Stress : Only a little   Housing Stability: Unknown (10/22/2024)    Housing Stability Vital Sign     Unable to Pay for Housing in the Last Year: No        Allergies:  Review of patient's allergies indicates:   Allergen Reactions    Adhesive      Pt states allergic to "Paper Tape" = rash        ROS:  Review of Systems   Unable to perform ROS: Psychiatric disorder          Objective      There were no vitals taken for this visit.  Ht Readings from Last 3 Encounters:   07/28/25 5' 8" (1.727 m)   06/30/25 5' 8" (1.727 m)   06/12/25 5' 8" (1.727 m)     Wt Readings from Last 3 Encounters:   07/28/25 67.8 kg (149 lb 7.6 oz)   06/30/25 67.8 kg (149 lb 7.6 oz)   06/12/25 69.3 kg (152 lb 12.5 oz)       PHYSICAL EXAM:  Physical Exam  Vitals reviewed: patient not on for the visit, mainly spoke with family.                      Assessment    1. Paranoia    2. Delusional disorder    3. Obstructive hydrocephalus    4. Need for assistance with personal care    5. 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  - Continued Zyprexa (olanzapine) at current dose to manage psychotic symptoms, acknowledging potential side effect of drowsiness.  - Avoided Zoloft and trazodone due to adverse reactions  - Monitoring for acute changes in mental status or behavior that may require emergency psychiatric " intervention.    Plan    Brock was seen today for hospital follow up.    Diagnoses and all orders for this visit:    Paranoia  -     Ambulatory referral/consult to Psychiatry; Future    Delusional disorder    Obstructive hydrocephalus  -     Ambulatory referral/consult to Neurology; Future    Need for assistance with personal care    Need for continuous supervision        PLAN SUMMARY:   Ensure all weapons are locked up and secured   Continue Zyprexa (olanzapine) at current dose for psychotic symptoms   Refer to neurology for evaluation of hydrocephalus and its impact on symptoms   Refer urgently to psychiatry, to be arranged internally through [facility]   Provide information on local resources for in-home care options   Call 911 if condition worsens or safety becomes a concern   Follow up with neurology, potentially with Dr. Patterson at [facility]   Schedule follow-up after neurology and psychiatry appointments have been arranged    NORMAL PRESSURE HYDROCEPHALUS:   Monitored CT results showing enlarged ventricles indicating hydrocephalus.   Evaluated as a possible cause of the patient's symptoms, noting uncertainty about its role in the paranoid behavior.   Educated patient on the potential relationship between neurological conditions and psychiatric symptoms.   Will follow up with neurology for further evaluation of hydrocephalus and its impact on symptoms, potentially with Dr. Patterson at [facility] for expedited access.    COGNITIVE DECLINE/PARANOIA/DELUSIONAL BEHAVIOR:   Will follow up with neurology and psychiatry to further evaluate cognitive decline and its impact on overall symptoms.    NEED FOR ASSISTANCE WITH PERSONAL CARE:   Monitored patient who cannot dress himself, take a shower, or manage meals independently.   Evaluated requirement for assistance with these personal care tasks.   Discussed need for in-home care and arranged for office to provide information on local resources for  in-home care options.    NEED FOR CONTINUOUS SUPERVISION:   Monitored patient who cannot be trusted alone due to safety concerns and requires continuous supervision.   Explored options for in-home care or sitters to assist with daily care and supervision.   Instructed to ensure all weapons are locked up and secured.   Advised to call 911 immediately if condition worsens or if safety becomes a concern.   Contact the office if condition changes or worsens.    The patient location is: home  The chief complaint leading to consultation is: Hospital f/u     Visit type: audiovisual    Face to Face time with patient: 15 minutes    30 minutes of total time spent on the encounter, which includes face to face time and non-face to face time preparing to see the patient (eg, review of tests), Obtaining and/or reviewing separately obtained history, Documenting clinical information in the electronic or other health record, Independently interpreting results (not separately reported) and communicating results to the patient/family/caregiver, or Care coordination (not separately reported). Visit today included increased complexity associated with the care of the episodic problem addressed and managing the longitudinal care of the patient due to the serious and/or complex managed problem(s).    Each patient to whom he or she provides medical services by telemedicine is:  (1) informed of the relationship between the physician and patient and the respective role of any other health care provider with respect to management of the patient; and (2) notified that he or she may decline to receive medical services by telemedicine and may withdraw from such care at any time.    Signed by:  Enid Gil PA-C         [1]   Current Outpatient Medications on File Prior to Visit   Medication Sig Dispense Refill    empagliflozin (JARDIANCE) 10 mg tablet Take 1 tablet (10 mg total) by mouth once daily. 90 tablet 3    famotidine (PEPCID) 40 MG  tablet Take 1 tablet (40 mg total) by mouth daily as needed for Heartburn. 90 tablet 3    ketoconazole (NIZORAL) 2 % cream Apply topically 2 (two) times daily. To affected area on foot and toes 15 g 0    loratadine (CLARITIN) 10 mg tablet Take 1 tablet (10 mg total) by mouth once daily. 90 tablet 3    multivitamin (ONE DAILY MULTIVITAMIN) per tablet Take 1 tablet by mouth once daily.      OLANZapine (ZYPREXA) 2.5 MG tablet Take by mouth every evening.      pantoprazole (PROTONIX) 40 MG tablet Take 1 tablet (40 mg total) by mouth 2 (two) times daily. 180 tablet 0    tamsulosin (FLOMAX) 0.4 mg Cap Take 2 capsules (0.8 mg total) by mouth once daily. 60 capsule 6    tirzepatide (MOUNJARO) 2.5 mg/0.5 mL PnIj Inject 2.5 mg into the skin every 7 days. 2 mL 12    walker (ULTRA-LIGHT ROLLATOR) Misc Please dispense one ultralight Rollator, upright, walker to be used for ambulation. 1 each 0    sertraline (ZOLOFT) 25 MG tablet Take 1 tablet (25 mg total) by mouth once daily. 90 tablet 3    traZODone (DESYREL) 50 MG tablet Take 1 tablet (50 mg total) by mouth every evening. 90 tablet 3     No current facility-administered medications on file prior to visit.

## 2025-07-21 ENCOUNTER — PATIENT MESSAGE (OUTPATIENT)
Dept: PRIMARY CARE CLINIC | Facility: CLINIC | Age: 85
End: 2025-07-21
Payer: MEDICARE

## 2025-07-22 ENCOUNTER — TELEPHONE (OUTPATIENT)
Dept: PSYCHIATRY | Facility: CLINIC | Age: 85
End: 2025-07-22
Payer: MEDICARE

## 2025-07-22 ENCOUNTER — TELEPHONE (OUTPATIENT)
Dept: PRIMARY CARE CLINIC | Facility: CLINIC | Age: 85
End: 2025-07-22
Payer: MEDICARE

## 2025-07-22 NOTE — TELEPHONE ENCOUNTER
Spoke with pt Son , Pt son advised me that pt is still in the hospital as of now and they'll make follow up appt on the portal or call in when pt is ready for visit.        Copied from CRM #[id number]. Topic: Appointments - Hospital Follow Up  >> Jul 22, 2025  2:02 PM Charity wrote:  Type:  Sooner Apoointment Request    Caller is requesting a sooner appointment.  Caller declined first available appointment listed below.  Caller will not accept being placed on the waitlist and is requesting a message be sent to doctor.  Name of Caller:hospital   When is the first available appointment?in the next 2 weeks   Symptoms:hospital follow up  Would the patient rather a call back or a response via [facility]? Call back  Best Call Back Number:[phone] son villa, daughter in law Halle [phone]  Additional Information: n [phone]

## 2025-07-22 NOTE — TELEPHONE ENCOUNTER
Spoke with Halle (daughter/caregiver) and the pt has a ref from Dr Kong for MM and is currently inpatient at [facility] and d/c's tomorrow 7/23 - they will fax d/c papers to us and pt has had no prior psych care - booked np med mgmt appt w/ Dr OH on 8/14

## 2025-07-28 ENCOUNTER — OFFICE VISIT (OUTPATIENT)
Dept: PODIATRY | Facility: CLINIC | Age: 85
End: 2025-07-28
Payer: MEDICARE

## 2025-07-28 VITALS — HEIGHT: 68 IN | WEIGHT: 149.5 LBS | BODY MASS INDEX: 22.66 KG/M2

## 2025-07-28 DIAGNOSIS — L60.1 ONYCHOLYSIS OF TOENAIL: ICD-10-CM

## 2025-07-28 DIAGNOSIS — L60.1 TRAUMATIC ONYCHOLYSIS: Primary | ICD-10-CM

## 2025-07-28 DIAGNOSIS — E11.628 TYPE 2 DIABETES MELLITUS WITH OTHER SKIN COMPLICATION, WITHOUT LONG-TERM CURRENT USE OF INSULIN: ICD-10-CM

## 2025-07-28 PROCEDURE — 1100F PTFALLS ASSESS-DOCD GE2>/YR: CPT | Mod: CPTII,S$GLB,, | Performed by: PODIATRIST

## 2025-07-28 PROCEDURE — 3288F FALL RISK ASSESSMENT DOCD: CPT | Mod: CPTII,S$GLB,, | Performed by: PODIATRIST

## 2025-07-28 PROCEDURE — 1159F MED LIST DOCD IN RCRD: CPT | Mod: CPTII,S$GLB,, | Performed by: PODIATRIST

## 2025-07-28 PROCEDURE — 99999 PR PBB SHADOW E&M-EST. PATIENT-LVL III: CPT | Mod: PBBFAC,,, | Performed by: PODIATRIST

## 2025-07-28 PROCEDURE — 1126F AMNT PAIN NOTED NONE PRSNT: CPT | Mod: CPTII,S$GLB,, | Performed by: PODIATRIST

## 2025-07-28 PROCEDURE — 99214 OFFICE O/P EST MOD 30 MIN: CPT | Mod: S$GLB,,, | Performed by: PODIATRIST

## 2025-07-28 PROCEDURE — 1160F RVW MEDS BY RX/DR IN RCRD: CPT | Mod: CPTII,S$GLB,, | Performed by: PODIATRIST

## 2025-07-28 RX ORDER — AMOXICILLIN AND CLAVULANATE POTASSIUM 875; 125 MG/1; MG/1
1 TABLET, FILM COATED ORAL 2 TIMES DAILY
Qty: 14 TABLET | Refills: 0 | Status: SHIPPED | OUTPATIENT
Start: 2025-07-28 | End: 2025-08-04

## 2025-07-28 NOTE — PROGRESS NOTES
"  Subjective:       Patient ID: Brock Camara is a 85 y.o. male.    Chief Complaint: Nail Care (Nail care, no pain, diabetic pt, pt is wearing crocs, )      HPI: Patient presents to the office today with the chief complaint of abnormal appearance of the bilateral hallux nails.  He relates that the nails have been in her current shaped for proximally 2 months.  Not currently on oral antibiotics.  States that his nails are "sick".  Reports that family members assisting with dressing changes.   This patient is a Diabetic Type II. Patient does follow with Primary Care and/or Endocrinology for management of Diabetes Mellitus. This patient's PMD is Adam Kong MD. This patient last saw his/her primary care provider on 06/30/2025.     Hemoglobin A1C   Date Value Ref Range Status   12/30/2024 7.7 (H) 4.0 - 5.6 % Final     Comment:     ADA Screening Guidelines:  5.7-6.4%  Consistent with prediabetes  >or=6.5%  Consistent with diabetes    High levels of fetal hemoglobin interfere with the HbA1C  assay. Heterozygous hemoglobin variants (HbS, HgC, etc)do  not significantly interfere with this assay.   However, presence of multiple variants may affect accuracy.       Hemoglobin A1c   Date Value Ref Range Status   06/12/2025 7.0 (H) 4.0 - 5.6 % Final     Comment:     ADA Screening Guidelines:  5.7-6.4%  Consistent with prediabetes  >=6.5%  Consistent with diabetes    High levels of fetal hemoglobin interfere with the HbA1C  assay. Heterozygous hemoglobin variants (HbS, HgC, etc)do  not significantly interfere with this assay.   However, presence of multiple variants may affect accuracy.   .     Review of patient's allergies indicates:   Allergen Reactions    Adhesive      Pt states allergic to "Paper Tape" = rash       Past Medical History:   Diagnosis Date    Diabetes mellitus, type 2     Hyperlipidemia        No family history on file.    Social History[1]    Past Surgical History:   Procedure Laterality Date    " "SHOULDER SURGERY      when he was 25 years old       Review of Systems       Objective:   Ht 5' 8" (1.727 m)   Wt 67.8 kg (149 lb 7.6 oz)   BMI 22.73 kg/m²     Physical Exam  LOWER EXTREMITY PHYSICAL EXAMINATION    VASCULAR:  The right dorsalis pedis pulse 2/4 and the right posterior tibial pulse 1/4.  The left dorsalis pedis pulse 2/4 and posterior tibial pulse on the left is 1/4.  Capillary refill is intact.  Pedal hair growth decreased.     NEUROLOGY: Protective sensation is not intact to the left and right plantar surfaces of the foot and digits, as the patient has no sensation/detection at greater than 4 distinct points of contact with 5.07 [place] Lissette monofilament. Sensation to light touch is intact on the left and right foot. Proprioception is intact, bilateral. Sensation to pin prick is reduced to absent. Vibratory sensation is diminished.    DERMATOLOGY:  Right hallux nail is completely elevated from the nail bed and remains only attached to the central aspect of the proximal eponychium.  There is a macerated appearance to the nail bed.  There is no signs of erythema.  No nail bed laceration.  Left hallux nail is partially attached to the nail bed.  Remains attached to the proximal 1/3 nail bed and underlying the proximal eponychium.  No signs of acute infection.  Mild maceration noted to the interdigital spaces    Assessment:     1. Traumatic onycholysis    2. Onycholysis of toenail    3. Type 2 diabetes mellitus with other skin complication, without long-term current use of insulin        Plan:     Traumatic onycholysis    Onycholysis of toenail    Type 2 diabetes mellitus with other skin complication, without long-term current use of insulin    Other orders  -     amoxicillin-clavulanate 875-125mg (AUGMENTIN) 875-125 mg per tablet; Take 1 tablet by mouth 2 (two) times daily. for 7 days  Dispense: 14 tablet; Refill: 0        Thorough discussion is had with the patient this afternoon, concerning the " diagnosis, its etiology, and the treatment algorithm at present.  Greater than 50% of this visit spent on counseling and coordination of care. Greater than 15 minutes of a 20 minute appointment spent on education about the diabetic foot, neuropathy, and prevention of limb loss.  Shoe inspection. Diabetic Foot Education. Patient reminded of the importance of good nutrition and blood sugar control to help prevent podiatric complications of diabetes. Patient instructed on proper foot hygeine. We discussed wearing proper and supportive shoe gear, daily foot inspections, never walking barefooted or sock footed, never putting sharp instruments to feet which can cause major complications associated with infection, ulcers, lacerations.      Patient does have history of onycholysis specifically to the right hallux nail plate.  Attempted to cut the nail plate but this resulted in an avulsion.  Patient tolerated this well.  Will have patient apply iodine soaked gauze to the great toe with daily dressing changes.    Recommend iodine to the interdigital spaces to reduce risk of complications, infection, and maceration    Follow up in 1 week    Future Appointments   Date Time Provider Department Center   8/11/2025  2:00 PM Taylor Davies DPM [facility]   8/14/2025  9:00 AM Oleg Maier MD [facility] PSYCH [place]   10/23/2025  8:00 AM LABORATORY, [facility] LAB [facility]   10/30/2025  3:30 PM Adam Kong MD [facility]   12/9/2025  8:00 AM LABORATORY, [facility] LAB [facility]   12/16/2025 11:45 AM Ember Masters MD [facility]   1/16/2026  1:00 PM Jr Huynh MD [facility] NEURO [facility]           [1]   Social History  Socioeconomic History    Marital status: Single   Tobacco Use    Smoking status: Former     Passive exposure: Past    Smokeless tobacco: Never   Substance and Sexual Activity    Alcohol use: Not Currently    Drug use: Never    Sexual  activity: Yes     Partners: Female     Social Drivers of Health     Financial Resource Strain: Low Risk  (10/22/2024)    Overall Financial Resource Strain (CARDIA)     Difficulty of Paying Living Expenses: Not hard at all   Food Insecurity: No Food Insecurity (10/22/2024)    Hunger Vital Sign     Worried About Running Out of Food in the Last Year: Never true     Ran Out of Food in the Last Year: Never true   Transportation Needs: No Transportation Needs (10/5/2024)    Received from [facility] Sub[facility] and Affiliates    PRAPARE - Transportation     Lack of Transportation (Medical): No     Lack of Transportation (Non-Medical): No   Physical Activity: Insufficiently Active (10/22/2024)    Exercise Vital Sign     Days of Exercise per Week: 5 days     Minutes of Exercise per Session: 20 min   Stress: No Stress Concern Present (10/22/2024)    [employer] - Occupational Stress Questionnaire     Feeling of Stress : Only a little   Housing Stability: Unknown (10/22/2024)    Housing Stability Vital Sign     Unable to Pay for Housing in the Last Year: No

## 2025-08-11 ENCOUNTER — OFFICE VISIT (OUTPATIENT)
Dept: PODIATRY | Facility: CLINIC | Age: 85
End: 2025-08-11
Payer: MEDICARE

## 2025-08-11 DIAGNOSIS — L60.1 TRAUMATIC ONYCHOLYSIS: Primary | ICD-10-CM

## 2025-08-11 DIAGNOSIS — E11.628 TYPE 2 DIABETES MELLITUS WITH OTHER SKIN COMPLICATION, WITHOUT LONG-TERM CURRENT USE OF INSULIN: ICD-10-CM

## 2025-08-11 DIAGNOSIS — I73.9 PAD (PERIPHERAL ARTERY DISEASE): ICD-10-CM

## 2025-08-11 PROCEDURE — 99999 PR PBB SHADOW E&M-EST. PATIENT-LVL III: CPT | Mod: PBBFAC,,, | Performed by: PODIATRIST

## 2025-08-11 PROCEDURE — 3288F FALL RISK ASSESSMENT DOCD: CPT | Mod: CPTII,S$GLB,, | Performed by: PODIATRIST

## 2025-08-11 PROCEDURE — 1159F MED LIST DOCD IN RCRD: CPT | Mod: CPTII,S$GLB,, | Performed by: PODIATRIST

## 2025-08-11 PROCEDURE — 1100F PTFALLS ASSESS-DOCD GE2>/YR: CPT | Mod: CPTII,S$GLB,, | Performed by: PODIATRIST

## 2025-08-11 PROCEDURE — 99213 OFFICE O/P EST LOW 20 MIN: CPT | Mod: S$GLB,,, | Performed by: PODIATRIST

## 2025-08-11 PROCEDURE — 1160F RVW MEDS BY RX/DR IN RCRD: CPT | Mod: CPTII,S$GLB,, | Performed by: PODIATRIST

## 2025-08-14 ENCOUNTER — OFFICE VISIT (OUTPATIENT)
Dept: PSYCHIATRY | Facility: CLINIC | Age: 85
End: 2025-08-14
Payer: MEDICARE

## 2025-08-14 VITALS
HEART RATE: 73 BPM | BODY MASS INDEX: 22.46 KG/M2 | SYSTOLIC BLOOD PRESSURE: 117 MMHG | WEIGHT: 147.69 LBS | DIASTOLIC BLOOD PRESSURE: 67 MMHG

## 2025-08-14 DIAGNOSIS — F22 PARANOIA: ICD-10-CM

## 2025-08-14 DIAGNOSIS — F03.90 MAJOR NEUROCOGNITIVE DISORDER: Primary | ICD-10-CM

## 2025-08-14 DIAGNOSIS — F22 DELUSIONAL DISORDER: ICD-10-CM

## 2025-08-14 PROCEDURE — 90792 PSYCH DIAG EVAL W/MED SRVCS: CPT | Mod: S$GLB,,, | Performed by: PSYCHIATRY & NEUROLOGY

## 2025-08-14 PROCEDURE — 99999 PR PBB SHADOW E&M-EST. PATIENT-LVL II: CPT | Mod: PBBFAC,,, | Performed by: PSYCHIATRY & NEUROLOGY

## 2025-08-14 PROCEDURE — 3078F DIAST BP <80 MM HG: CPT | Mod: CPTII,S$GLB,, | Performed by: PSYCHIATRY & NEUROLOGY

## 2025-08-14 PROCEDURE — 3074F SYST BP LT 130 MM HG: CPT | Mod: CPTII,S$GLB,, | Performed by: PSYCHIATRY & NEUROLOGY

## 2025-08-14 RX ORDER — RISPERIDONE 0.25 MG/1
0.25 TABLET ORAL NIGHTLY
Qty: 30 TABLET | Refills: 3 | Status: SHIPPED | OUTPATIENT
Start: 2025-08-14 | End: 2026-08-14

## 2025-08-14 RX ORDER — ESCITALOPRAM OXALATE 5 MG/1
5 TABLET ORAL DAILY
Qty: 30 TABLET | Refills: 3 | Status: SHIPPED | OUTPATIENT
Start: 2025-08-14 | End: 2026-08-14

## 2025-08-19 ENCOUNTER — OFFICE VISIT (OUTPATIENT)
Dept: PODIATRY | Facility: CLINIC | Age: 85
End: 2025-08-19
Payer: MEDICARE

## 2025-08-19 DIAGNOSIS — E11.628 TYPE 2 DIABETES MELLITUS WITH OTHER SKIN COMPLICATION, WITHOUT LONG-TERM CURRENT USE OF INSULIN: Primary | ICD-10-CM

## 2025-08-19 DIAGNOSIS — L60.1 ONYCHOLYSIS OF TOENAIL: ICD-10-CM

## 2025-08-19 DIAGNOSIS — I73.9 PAD (PERIPHERAL ARTERY DISEASE): ICD-10-CM

## 2025-08-19 PROCEDURE — 1126F AMNT PAIN NOTED NONE PRSNT: CPT | Mod: CPTII,S$GLB,, | Performed by: PODIATRIST

## 2025-08-19 PROCEDURE — 1100F PTFALLS ASSESS-DOCD GE2>/YR: CPT | Mod: CPTII,S$GLB,, | Performed by: PODIATRIST

## 2025-08-19 PROCEDURE — 1160F RVW MEDS BY RX/DR IN RCRD: CPT | Mod: CPTII,S$GLB,, | Performed by: PODIATRIST

## 2025-08-19 PROCEDURE — 1159F MED LIST DOCD IN RCRD: CPT | Mod: CPTII,S$GLB,, | Performed by: PODIATRIST

## 2025-08-19 PROCEDURE — 99999 PR PBB SHADOW E&M-EST. PATIENT-LVL III: CPT | Mod: PBBFAC,,, | Performed by: PODIATRIST

## 2025-08-19 PROCEDURE — 3288F FALL RISK ASSESSMENT DOCD: CPT | Mod: CPTII,S$GLB,, | Performed by: PODIATRIST

## 2025-08-19 PROCEDURE — 99213 OFFICE O/P EST LOW 20 MIN: CPT | Mod: S$GLB,,, | Performed by: PODIATRIST

## 2025-08-27 ENCOUNTER — TELEPHONE (OUTPATIENT)
Dept: PRIMARY CARE CLINIC | Facility: CLINIC | Age: 85
End: 2025-08-27
Payer: MEDICARE